# Patient Record
Sex: MALE | Race: WHITE | Employment: FULL TIME | URBAN - METROPOLITAN AREA
[De-identification: names, ages, dates, MRNs, and addresses within clinical notes are randomized per-mention and may not be internally consistent; named-entity substitution may affect disease eponyms.]

---

## 2018-01-22 ENCOUNTER — TRANSFERRED RECORDS (OUTPATIENT)
Dept: HEALTH INFORMATION MANAGEMENT | Facility: CLINIC | Age: 58
End: 2018-01-22

## 2018-04-16 ENCOUNTER — OFFICE VISIT (OUTPATIENT)
Dept: INTERNAL MEDICINE | Facility: CLINIC | Age: 58
End: 2018-04-16
Payer: COMMERCIAL

## 2018-04-16 VITALS
BODY MASS INDEX: 25.16 KG/M2 | TEMPERATURE: 98 F | DIASTOLIC BLOOD PRESSURE: 80 MMHG | HEIGHT: 68 IN | HEART RATE: 60 BPM | WEIGHT: 166 LBS | OXYGEN SATURATION: 99 % | SYSTOLIC BLOOD PRESSURE: 142 MMHG

## 2018-04-16 DIAGNOSIS — Z00.00 ROUTINE GENERAL MEDICAL EXAMINATION AT A HEALTH CARE FACILITY: Primary | ICD-10-CM

## 2018-04-16 LAB
ALBUMIN SERPL-MCNC: 4.1 G/DL (ref 3.4–5)
ALBUMIN UR-MCNC: NEGATIVE MG/DL
ALP SERPL-CCNC: 51 U/L (ref 40–150)
ALT SERPL W P-5'-P-CCNC: 33 U/L (ref 0–70)
ANION GAP SERPL CALCULATED.3IONS-SCNC: 5 MMOL/L (ref 3–14)
APPEARANCE UR: CLEAR
AST SERPL W P-5'-P-CCNC: 45 U/L (ref 0–45)
BILIRUB SERPL-MCNC: 0.4 MG/DL (ref 0.2–1.3)
BILIRUB UR QL STRIP: NEGATIVE
BUN SERPL-MCNC: 21 MG/DL (ref 7–30)
CALCIUM SERPL-MCNC: 9 MG/DL (ref 8.5–10.1)
CHLORIDE SERPL-SCNC: 108 MMOL/L (ref 94–109)
CHOLEST SERPL-MCNC: 214 MG/DL
CO2 SERPL-SCNC: 27 MMOL/L (ref 20–32)
COLOR UR AUTO: YELLOW
CREAT SERPL-MCNC: 0.78 MG/DL (ref 0.66–1.25)
ERYTHROCYTE [DISTWIDTH] IN BLOOD BY AUTOMATED COUNT: 12.7 % (ref 10–15)
GFR SERPL CREATININE-BSD FRML MDRD: >90 ML/MIN/1.7M2
GLUCOSE SERPL-MCNC: 99 MG/DL (ref 70–99)
GLUCOSE UR STRIP-MCNC: NEGATIVE MG/DL
HCT VFR BLD AUTO: 40 % (ref 40–53)
HDLC SERPL-MCNC: 70 MG/DL
HGB BLD-MCNC: 12.9 G/DL (ref 13.3–17.7)
HGB UR QL STRIP: NEGATIVE
KETONES UR STRIP-MCNC: NEGATIVE MG/DL
LDLC SERPL CALC-MCNC: 119 MG/DL
LEUKOCYTE ESTERASE UR QL STRIP: NEGATIVE
MCH RBC QN AUTO: 29.3 PG (ref 26.5–33)
MCHC RBC AUTO-ENTMCNC: 32.3 G/DL (ref 31.5–36.5)
MCV RBC AUTO: 91 FL (ref 78–100)
NITRATE UR QL: NEGATIVE
NONHDLC SERPL-MCNC: 144 MG/DL
PH UR STRIP: 5.5 PH (ref 5–7)
PLATELET # BLD AUTO: 259 10E9/L (ref 150–450)
POTASSIUM SERPL-SCNC: 4.1 MMOL/L (ref 3.4–5.3)
PROT SERPL-MCNC: 7 G/DL (ref 6.8–8.8)
PSA SERPL-ACNC: 0.3 UG/L (ref 0–4)
RBC # BLD AUTO: 4.4 10E12/L (ref 4.4–5.9)
SODIUM SERPL-SCNC: 140 MMOL/L (ref 133–144)
SOURCE: NORMAL
SP GR UR STRIP: <=1.005 (ref 1–1.03)
TRIGL SERPL-MCNC: 123 MG/DL
TSH SERPL DL<=0.005 MIU/L-ACNC: 0.81 MU/L (ref 0.4–4)
UROBILINOGEN UR STRIP-ACNC: 0.2 EU/DL (ref 0.2–1)
WBC # BLD AUTO: 8.3 10E9/L (ref 4–11)

## 2018-04-16 PROCEDURE — 85027 COMPLETE CBC AUTOMATED: CPT | Performed by: INTERNAL MEDICINE

## 2018-04-16 PROCEDURE — 81003 URINALYSIS AUTO W/O SCOPE: CPT | Performed by: INTERNAL MEDICINE

## 2018-04-16 PROCEDURE — 36415 COLL VENOUS BLD VENIPUNCTURE: CPT | Performed by: INTERNAL MEDICINE

## 2018-04-16 PROCEDURE — G0103 PSA SCREENING: HCPCS | Performed by: INTERNAL MEDICINE

## 2018-04-16 PROCEDURE — 80053 COMPREHEN METABOLIC PANEL: CPT | Performed by: INTERNAL MEDICINE

## 2018-04-16 PROCEDURE — 84443 ASSAY THYROID STIM HORMONE: CPT | Performed by: INTERNAL MEDICINE

## 2018-04-16 PROCEDURE — 99386 PREV VISIT NEW AGE 40-64: CPT | Performed by: INTERNAL MEDICINE

## 2018-04-16 PROCEDURE — 80061 LIPID PANEL: CPT | Performed by: INTERNAL MEDICINE

## 2018-04-16 NOTE — LETTER
April 16, 2018      Jesus Haley  [address] [address] [address]        Dear ,    The results of your recent tests are within acceptable limits.  Enclosed is a copy of the results.  It was a pleasure to see you at your last appointment.    Resulted Orders   CBC with platelets   Result Value Ref Range    WBC 8.3 4.0 - 11.0 10e9/L    RBC Count 4.40 4.4 - 5.9 10e12/L    Hemoglobin 12.9 (L) 13.3 - 17.7 g/dL      Comment:      Results confirmed by repeat test    Hematocrit 40.0 40.0 - 53.0 %    MCV 91 78 - 100 fl    MCH 29.3 26.5 - 33.0 pg    MCHC 32.3 31.5 - 36.5 g/dL    RDW 12.7 10.0 - 15.0 %    Platelet Count 259 150 - 450 10e9/L   Comprehensive metabolic panel   Result Value Ref Range    Sodium 140 133 - 144 mmol/L    Potassium 4.1 3.4 - 5.3 mmol/L    Chloride 108 94 - 109 mmol/L    Carbon Dioxide 27 20 - 32 mmol/L    Anion Gap 5 3 - 14 mmol/L    Glucose 99 70 - 99 mg/dL    Urea Nitrogen 21 7 - 30 mg/dL    Creatinine 0.78 0.66 - 1.25 mg/dL    GFR Estimate >90 >60 mL/min/1.7m2      Comment:      Non  GFR Calc    GFR Estimate If Black >90 >60 mL/min/1.7m2      Comment:       GFR Calc    Calcium 9.0 8.5 - 10.1 mg/dL    Bilirubin Total 0.4 0.2 - 1.3 mg/dL    Albumin 4.1 3.4 - 5.0 g/dL    Protein Total 7.0 6.8 - 8.8 g/dL    Alkaline Phosphatase 51 40 - 150 U/L    ALT 33 0 - 70 U/L    AST 45 0 - 45 U/L   TSH with free T4 reflex   Result Value Ref Range    TSH 0.81 0.40 - 4.00 mU/L   Prostate spec antigen screen   Result Value Ref Range    PSA 0.30 0 - 4 ug/L      Comment:      Assay Method:  Chemiluminescence using Siemens Vista analyzer   *UA reflex to Microscopic   Result Value Ref Range    Color Urine Yellow     Appearance Urine Clear     Glucose Urine Negative NEG^Negative mg/dL    Bilirubin Urine Negative NEG^Negative    Ketones Urine Negative NEG^Negative mg/dL    Specific Gravity Urine <=1.005 1.003 - 1.035    Blood Urine Negative NEG^Negative    pH  Urine 5.5 5.0 - 7.0 pH    Protein Albumin Urine Negative NEG^Negative mg/dL    Urobilinogen Urine 0.2 0.2 - 1.0 EU/dL    Nitrite Urine Negative NEG^Negative    Leukocyte Esterase Urine Negative NEG^Negative    Source Midstream Urine    Lipid Profile (Chol, Trig, HDL, LDL calc)   Result Value Ref Range    Cholesterol 214 (H) <200 mg/dL      Comment:      Desirable:       <200 mg/dl    Triglycerides 123 <150 mg/dL    HDL Cholesterol 70 >39 mg/dL    LDL Cholesterol Calculated 119 (H) <100 mg/dL      Comment:      Above desirable:  100-129 mg/dl  Borderline High:  130-159 mg/dL  High:             160-189 mg/dL  Very high:       >189 mg/dl      Non HDL Cholesterol 144 (H) <130 mg/dL      Comment:      Above Desirable:  130-159 mg/dl  Borderline high:  160-189 mg/dl  High:             190-219 mg/dl  Very high:       >219 mg/dl         If you have any questions or concerns, please call the clinic at the number listed above.       Sincerely,        Eliot Jay MD

## 2018-04-16 NOTE — NURSING NOTE
"Chief Complaint   Patient presents with     Physical     Non Fasting Px     Swelling     Occasionall swelling of Rt ankle       Initial /80  Pulse 60  Temp 98  F (36.7  C) (Oral)  Ht 5' 8\" (1.727 m)  Wt 166 lb (75.3 kg)  SpO2 99%  BMI 25.24 kg/m2 Estimated body mass index is 25.24 kg/(m^2) as calculated from the following:    Height as of this encounter: 5' 8\" (1.727 m).    Weight as of this encounter: 166 lb (75.3 kg).  Medication Reconciliation: complete   Deepthi Souza MA      "

## 2018-04-16 NOTE — MR AVS SNAPSHOT
"              After Visit Summary   2018    Jesus Haley    MRN: [id number]           Patient Information     Date Of Birth          1960        Visit Information        Provider Department      2018 8:00 AM Eliot Jay MD [facility]        Today's Diagnoses     Routine general medical examination at a health care facility    -  1       Follow-ups after your visit        Follow-up notes from your care team     Return in about 1 year (around 2019) for Physical Exam.      Who to contact     If you have questions or need follow up information about today's clinic visit or your schedule please contact [facility] directly at [phone].  Normal or non-critical lab and imaging results will be communicated to you by MyChart, letter or phone within 4 business days after the clinic has received the results. If you do not hear from us within 7 days, please contact the clinic through MyChart or phone. If you have a critical or abnormal lab result, we will notify you by phone as soon as possible.  Submit refill requests through [employer] or call your pharmacy and they will forward the refill request to us. Please allow 3 business days for your refill to be completed.          Additional Information About Your Visit        MyChart Information     [employer] lets you send messages to your doctor, view your test results, renew your prescriptions, schedule appointments and more. To sign up, go to [email or web address] . Click on \"Log in\" on the left side of the screen, which will take you to the Welcome page. Then click on \"Sign up Now\" on the right side of the page.     You will be asked to enter the access code listed below, as well as some personal information. Please follow the directions to create your username and password.     Your access code is: [email or web address]  Expires: 7/15/2018  8:38 AM     Your access code will  in 90 days. If you need help or a new code, " "please call your [facility] clinic or [phone].        Care EveryWhere ID     This is your Care EveryWhere ID. This could be used by other organizations to access your [facility] medical records  [id number]        Your Vitals Were     Pulse Temperature Height Pulse Oximetry BMI (Body Mass Index)       60 98  F (36.7  C) (Oral) 5' 8\" (1.727 m) 99% 25.24 kg/m2        Blood Pressure from Last 3 Encounters:   04/16/18 142/80   09/15/04 130/82   09/15/04 130/82    Weight from Last 3 Encounters:   04/16/18 166 lb (75.3 kg)   09/15/04 173 lb (78.5 kg)   09/15/04 173 lb (78.5 kg)              We Performed the Following     *UA reflex to Microscopic     CBC with platelets     Comprehensive metabolic panel     Lipid panel reflex to direct LDL Fasting     Prostate spec antigen screen     TSH with free T4 reflex        Primary Care Provider Office Phone # Fax #    Eliot Jay [phone]       [address] [facility] [address]        Equal Access to Services     [facility]: Hadii aad ku hadasho Soomaali, waaxda luqadaha, qaybta kaalmada adebettie, mervin snyder . So [facility] [phone].    ATENCIÓN: Si habla español, tiene a montiel disposición servicios gratuitos de asistencia lingüística. Ramon al [phone].    We comply with applicable federal civil rights laws and [place] laws. We do not discriminate on the basis of race, color, national origin, age, disability, sex, sexual orientation, or gender identity.            Thank you!     Thank you for choosing [facility]  for your care. Our goal is always to provide you with excellent care. Hearing back from our patients is one way we can continue to improve our services. Please take a few minutes to complete the written survey that you may receive in the mail after your visit with us. Thank you!             Your Updated Medication List - Protect others around you: Learn how to safely use, store and " throw away your medicines at www.disposemymeds.org.      Notice  As of 4/16/2018  8:38 AM    You have not been prescribed any medications.

## 2018-04-16 NOTE — PROGRESS NOTES
SUBJECTIVE:   CC: Jesus Haley is an 57 year old male who presents for preventative health visit.     Physical   Annual:     Getting at least 3 servings of Calcium per day::  Yes    Bi-annual eye exam::  Yes    Dental care twice a year::  Yes    Sleep apnea or symptoms of sleep apnea::  None    Diet::  Regular (no restrictions)    Frequency of exercise::  6-7 days/week    Duration of exercise::  Greater than 60 minutes    Taking medications regularly::  Yes    Medication side effects::  None                    Today's PHQ-2 Score: 0  PHQ-2 ( 1999 Pfizer) 4/16/2018   Q1: Little interest or pleasure in doing things 0   Q2: Feeling down, depressed or hopeless 0   PHQ-2 Score 0   Q1: Little interest or pleasure in doing things Not at all   Q2: Feeling down, depressed or hopeless Not at all   PHQ-2 Score 0       Abuse: Current or Past(Physical, Sexual or Emotional)- No  Do you feel safe in your environment - Yes    Social History   Substance Use Topics     Smoking status: Never Smoker     Smokeless tobacco: Not on file     Alcohol use No     Alcohol Use 4/16/2018   If you drink alcohol do you typically have greater than 3 drinks per day OR greater than 7 drinks per week? No       Last PSA: No results found for: PSA    Reviewed orders with patient. Reviewed health maintenance and updated orders accordingly - Yes  Labs reviewed in EPIC  BP Readings from Last 3 Encounters:   04/16/18 142/80   09/15/04 130/82   09/15/04 130/82    Wt Readings from Last 3 Encounters:   04/16/18 166 lb (75.3 kg)   09/15/04 173 lb (78.5 kg)   09/15/04 173 lb (78.5 kg)                    Reviewed and updated as needed this visit by clinical staff         Reviewed and updated as needed this visit by Provider            Review of Systems  C: NEGATIVE for fever, chills, change in weight  I: NEGATIVE for worrisome rashes, moles or lesions  E: NEGATIVE for vision changes or irritation  ENT: NEGATIVE for ear, mouth and throat problems  R: NEGATIVE for  significant cough or SOB  CV: NEGATIVE for chest pain, palpitations or peripheral edema  GI: NEGATIVE for nausea, abdominal pain, heartburn, or change in bowel habits   male: negative for dysuria, hematuria, decreased urinary stream, erectile dysfunction, urethral discharge  M: NEGATIVE for significant arthralgias or myalgia  N: NEGATIVE for weakness, dizziness or paresthesias  P: NEGATIVE for changes in mood or affect    OBJECTIVE:   There were no vitals taken for this visit.    Physical Exam  GENERAL: healthy, alert and no distress  EYES: Eyes grossly normal to inspection, PERRL and conjunctivae and sclerae normal  HENT: ear canals and TM's normal, nose and mouth without ulcers or lesions  NECK: no adenopathy, no asymmetry, masses, or scars and thyroid normal to palpation  RESP: lungs clear to auscultation - no rales, rhonchi or wheezes  CV: regular rate and rhythm, normal S1 S2, no S3 or S4, no murmur, click or rub, no peripheral edema and peripheral pulses strong  ABDOMEN: soft, nontender, no hepatosplenomegaly, no masses and bowel sounds normal   (male): normal male genitalia without lesions or urethral discharge, no hernia  MS: no gross musculoskeletal defects noted, no edema  SKIN: no suspicious lesions or rashes  NEURO: Normal strength and tone, mentation intact and speech normal  PSYCH: mentation appears normal, affect normal/bright    ASSESSMENT/PLAN:       ICD-10-CM    1. Routine general medical examination at a health care facility Z00.00 CBC with platelets     Comprehensive metabolic panel     Lipid panel reflex to direct LDL Fasting     TSH with free T4 reflex     Prostate spec antigen screen     *UA reflex to Microscopic       Colonoscopy done age 51, normal , 10 years follow up recommended    COUNSELING:   Reviewed preventive health counseling, as reflected in patient instructions       Regular exercise       Healthy diet/nutrition       Vision screening       Hearing screening       Colon cancer  "screening       Prostate cancer screening         reports that he has never smoked. He does not have any smokeless tobacco history on file.    Estimated body mass index is 26.3 kg/(m^2) as calculated from the following:    Height as of 9/15/04: 5' 8\" (1.727 m).    Weight as of 9/15/04: 173 lb (78.5 kg).       Counseling Resources:  ATP IV Guidelines  Pooled Cohorts Equation Calculator  FRAX Risk Assessment  ICSI Preventive Guidelines  Dietary Guidelines for Americans, 2010  USDA's MyPlate  ASA Prophylaxis  Lung CA Screening    Eliot Jay MD  [facility]  Answers for HPI/ROS submitted by the patient on 4/16/2018   PHQ-2 Score: 0    "

## 2018-06-12 ENCOUNTER — TELEPHONE (OUTPATIENT)
Dept: INTERNAL MEDICINE | Facility: CLINIC | Age: 58
End: 2018-06-12

## 2018-06-12 NOTE — TELEPHONE ENCOUNTER
Panel Management Review      Patient has the following on his problem list: None      Composite cancer screening  Chart review shows that this patient is due/due soon for the following Colonoscopy  Summary:    Patient is due/failing the following:   COLONOSCOPY    Action needed:   Patient needs referral/order: Colonoscopy    Type of outreach:    Spoke to Pt, had Colonoscopy in 04/16/12.  update on Pt's chart    Questions for provider review:    None                                                                                                                                    Deepthi Souza MA       Chart routed to none .

## 2019-01-01 ENCOUNTER — ONCOLOGY VISIT (OUTPATIENT)
Dept: ONCOLOGY | Facility: CLINIC | Age: 59
End: 2019-01-01
Attending: INTERNAL MEDICINE
Payer: COMMERCIAL

## 2019-01-01 ENCOUNTER — HEALTH MAINTENANCE LETTER (OUTPATIENT)
Age: 59
End: 2019-01-01

## 2019-01-01 ENCOUNTER — HOSPITAL ENCOUNTER (EMERGENCY)
Facility: CLINIC | Age: 59
Discharge: HOME | End: 2019-09-14
Attending: EMERGENCY MEDICINE | Admitting: EMERGENCY MEDICINE
Payer: COMMERCIAL

## 2019-01-01 ENCOUNTER — TELEPHONE (OUTPATIENT)
Dept: ONCOLOGY | Facility: CLINIC | Age: 59
End: 2019-01-01

## 2019-01-01 ENCOUNTER — NURSE TRIAGE (OUTPATIENT)
Dept: NURSING | Facility: CLINIC | Age: 59
End: 2019-01-01

## 2019-01-01 ENCOUNTER — TRANSFERRED RECORDS (OUTPATIENT)
Dept: HEALTH INFORMATION MANAGEMENT | Facility: CLINIC | Age: 59
End: 2019-01-01

## 2019-01-01 ENCOUNTER — TELEPHONE (OUTPATIENT)
Dept: PHARMACY | Facility: CLINIC | Age: 59
End: 2019-01-01

## 2019-01-01 ENCOUNTER — OFFICE VISIT (OUTPATIENT)
Dept: UROLOGY | Facility: CLINIC | Age: 59
End: 2019-01-01
Payer: COMMERCIAL

## 2019-01-01 ENCOUNTER — PRE VISIT (OUTPATIENT)
Dept: UROLOGY | Facility: CLINIC | Age: 59
End: 2019-01-01

## 2019-01-01 ENCOUNTER — CARE COORDINATION (OUTPATIENT)
Dept: ONCOLOGY | Facility: CLINIC | Age: 59
End: 2019-01-01

## 2019-01-01 ENCOUNTER — HOSPITAL ENCOUNTER (INPATIENT)
Facility: CLINIC | Age: 59
LOS: 1 days | Discharge: HOME | DRG: 657 | End: 2019-08-02
Attending: UROLOGY | Admitting: UROLOGY
Payer: COMMERCIAL

## 2019-01-01 ENCOUNTER — HOSPITAL ENCOUNTER (OUTPATIENT)
Dept: GENERAL RADIOLOGY | Facility: CLINIC | Age: 59
End: 2019-08-22
Attending: RADIOLOGY
Payer: COMMERCIAL

## 2019-01-01 ENCOUNTER — HOSPITAL ENCOUNTER (OUTPATIENT)
Dept: CT IMAGING | Facility: CLINIC | Age: 59
Discharge: HOME | End: 2019-08-22
Attending: INTERNAL MEDICINE | Admitting: INTERNAL MEDICINE
Payer: COMMERCIAL

## 2019-01-01 ENCOUNTER — ANESTHESIA EVENT (OUTPATIENT)
Dept: SURGERY | Facility: CLINIC | Age: 59
DRG: 657 | End: 2019-01-01
Payer: COMMERCIAL

## 2019-01-01 ENCOUNTER — HOSPITAL ENCOUNTER (OUTPATIENT)
Facility: CLINIC | Age: 59
Setting detail: SPECIMEN
Discharge: HOME | End: 2019-07-29
Attending: INTERNAL MEDICINE | Admitting: INTERNAL MEDICINE
Payer: COMMERCIAL

## 2019-01-01 ENCOUNTER — ANCILLARY PROCEDURE (OUTPATIENT)
Dept: GENERAL RADIOLOGY | Facility: CLINIC | Age: 59
End: 2019-01-01
Attending: INTERNAL MEDICINE
Payer: COMMERCIAL

## 2019-01-01 ENCOUNTER — PATIENT OUTREACH (OUTPATIENT)
Dept: ONCOLOGY | Facility: CLINIC | Age: 59
End: 2019-01-01

## 2019-01-01 ENCOUNTER — TELEPHONE (OUTPATIENT)
Dept: UROLOGY | Facility: CLINIC | Age: 59
End: 2019-01-01

## 2019-01-01 ENCOUNTER — MYC MEDICAL ADVICE (OUTPATIENT)
Dept: ONCOLOGY | Facility: CLINIC | Age: 59
End: 2019-01-01

## 2019-01-01 ENCOUNTER — OFFICE VISIT (OUTPATIENT)
Dept: INTERNAL MEDICINE | Facility: CLINIC | Age: 59
End: 2019-01-01
Payer: COMMERCIAL

## 2019-01-01 ENCOUNTER — PRE VISIT (OUTPATIENT)
Dept: PULMONOLOGY | Facility: CLINIC | Age: 59
End: 2019-01-01

## 2019-01-01 ENCOUNTER — TELEPHONE (OUTPATIENT)
Dept: INTERNAL MEDICINE | Facility: CLINIC | Age: 59
End: 2019-01-01

## 2019-01-01 ENCOUNTER — HOSPITAL ENCOUNTER (OUTPATIENT)
Dept: CT IMAGING | Facility: CLINIC | Age: 59
Discharge: HOME | End: 2019-07-25
Attending: INTERNAL MEDICINE | Admitting: INTERNAL MEDICINE
Payer: COMMERCIAL

## 2019-01-01 ENCOUNTER — PRE VISIT (OUTPATIENT)
Dept: ONCOLOGY | Facility: CLINIC | Age: 59
End: 2019-01-01

## 2019-01-01 ENCOUNTER — ANESTHESIA (OUTPATIENT)
Dept: SURGERY | Facility: CLINIC | Age: 59
DRG: 657 | End: 2019-01-01
Payer: COMMERCIAL

## 2019-01-01 ENCOUNTER — HOSPITAL ENCOUNTER (OUTPATIENT)
Dept: CT IMAGING | Facility: CLINIC | Age: 59
Discharge: HOME | End: 2019-07-26
Attending: INTERNAL MEDICINE | Admitting: INTERNAL MEDICINE
Payer: COMMERCIAL

## 2019-01-01 VITALS
HEIGHT: 67 IN | SYSTOLIC BLOOD PRESSURE: 127 MMHG | HEART RATE: 58 BPM | TEMPERATURE: 98.2 F | OXYGEN SATURATION: 99 % | DIASTOLIC BLOOD PRESSURE: 76 MMHG | WEIGHT: 157.2 LBS | RESPIRATION RATE: 16 BRPM | BODY MASS INDEX: 24.67 KG/M2

## 2019-01-01 VITALS
SYSTOLIC BLOOD PRESSURE: 148 MMHG | WEIGHT: 157.7 LBS | HEART RATE: 69 BPM | BODY MASS INDEX: 22.58 KG/M2 | DIASTOLIC BLOOD PRESSURE: 84 MMHG | TEMPERATURE: 98.7 F | RESPIRATION RATE: 16 BRPM | OXYGEN SATURATION: 97 % | HEIGHT: 70 IN

## 2019-01-01 VITALS
DIASTOLIC BLOOD PRESSURE: 76 MMHG | RESPIRATION RATE: 16 BRPM | HEART RATE: 62 BPM | SYSTOLIC BLOOD PRESSURE: 125 MMHG | OXYGEN SATURATION: 96 %

## 2019-01-01 VITALS
HEART RATE: 54 BPM | HEIGHT: 67 IN | DIASTOLIC BLOOD PRESSURE: 85 MMHG | RESPIRATION RATE: 16 BRPM | WEIGHT: 157 LBS | DIASTOLIC BLOOD PRESSURE: 66 MMHG | HEART RATE: 56 BPM | WEIGHT: 162 LBS | BODY MASS INDEX: 24.59 KG/M2 | OXYGEN SATURATION: 90 % | SYSTOLIC BLOOD PRESSURE: 120 MMHG | OXYGEN SATURATION: 98 % | BODY MASS INDEX: 25.43 KG/M2 | TEMPERATURE: 98.2 F | SYSTOLIC BLOOD PRESSURE: 142 MMHG

## 2019-01-01 VITALS
OXYGEN SATURATION: 97 % | HEIGHT: 67 IN | BODY MASS INDEX: 24.64 KG/M2 | HEART RATE: 56 BPM | SYSTOLIC BLOOD PRESSURE: 132 MMHG | DIASTOLIC BLOOD PRESSURE: 86 MMHG | WEIGHT: 157 LBS

## 2019-01-01 VITALS
OXYGEN SATURATION: 98 % | SYSTOLIC BLOOD PRESSURE: 120 MMHG | DIASTOLIC BLOOD PRESSURE: 68 MMHG | HEART RATE: 59 BPM | WEIGHT: 150 LBS | HEIGHT: 67 IN | BODY MASS INDEX: 23.54 KG/M2

## 2019-01-01 VITALS
BODY MASS INDEX: 23.49 KG/M2 | DIASTOLIC BLOOD PRESSURE: 90 MMHG | SYSTOLIC BLOOD PRESSURE: 145 MMHG | HEART RATE: 67 BPM | WEIGHT: 150 LBS | OXYGEN SATURATION: 91 % | TEMPERATURE: 98 F | RESPIRATION RATE: 16 BRPM

## 2019-01-01 VITALS
OXYGEN SATURATION: 96 % | TEMPERATURE: 97.9 F | RESPIRATION RATE: 16 BRPM | SYSTOLIC BLOOD PRESSURE: 160 MMHG | WEIGHT: 163 LBS | DIASTOLIC BLOOD PRESSURE: 86 MMHG | HEART RATE: 63 BPM | BODY MASS INDEX: 25.53 KG/M2

## 2019-01-01 DIAGNOSIS — J06.9 VIRAL UPPER RESPIRATORY TRACT INFECTION: Primary | ICD-10-CM

## 2019-01-01 DIAGNOSIS — N28.89 RENAL MASS: Primary | ICD-10-CM

## 2019-01-01 DIAGNOSIS — R91.8 PULMONARY NODULES: ICD-10-CM

## 2019-01-01 DIAGNOSIS — C64.1 RENAL MALIGNANT NEOPLASM, RIGHT (H): Primary | ICD-10-CM

## 2019-01-01 DIAGNOSIS — G89.18 PAIN AT SURGICAL SITE: ICD-10-CM

## 2019-01-01 DIAGNOSIS — C64.1 RENAL MALIGNANT NEOPLASM, RIGHT (H): ICD-10-CM

## 2019-01-01 DIAGNOSIS — R59.0 MEDIASTINAL LYMPHADENOPATHY: ICD-10-CM

## 2019-01-01 DIAGNOSIS — C64.9 RENAL CELL CARCINOMA (H): Primary | ICD-10-CM

## 2019-01-01 DIAGNOSIS — N28.89 RIGHT KIDNEY MASS: ICD-10-CM

## 2019-01-01 DIAGNOSIS — N28.89 RIGHT KIDNEY MASS: Primary | ICD-10-CM

## 2019-01-01 DIAGNOSIS — G89.3 CANCER ASSOCIATED PAIN: ICD-10-CM

## 2019-01-01 DIAGNOSIS — I10 ESSENTIAL HYPERTENSION, BENIGN: ICD-10-CM

## 2019-01-01 DIAGNOSIS — Z00.00 ROUTINE GENERAL MEDICAL EXAMINATION AT A HEALTH CARE FACILITY: ICD-10-CM

## 2019-01-01 DIAGNOSIS — J06.9 VIRAL UPPER RESPIRATORY TRACT INFECTION: ICD-10-CM

## 2019-01-01 DIAGNOSIS — M62.81 GENERALIZED MUSCLE WEAKNESS: ICD-10-CM

## 2019-01-01 DIAGNOSIS — C64.9 METASTATIC RENAL CELL CARCINOMA (H): Primary | ICD-10-CM

## 2019-01-01 DIAGNOSIS — N28.89 RENAL MASS: ICD-10-CM

## 2019-01-01 LAB
ABO + RH BLD: NORMAL
ABO + RH BLD: NORMAL
ALBUMIN SERPL-MCNC: 3.4 G/DL (ref 3.4–5)
ALBUMIN SERPL-MCNC: 3.4 G/DL (ref 3.4–5)
ALBUMIN SERPL-MCNC: 3.6 G/DL (ref 3.4–5)
ALP SERPL-CCNC: 73 U/L (ref 40–150)
ALP SERPL-CCNC: 77 U/L (ref 40–150)
ALP SERPL-CCNC: 78 U/L (ref 40–150)
ALP SERPL-CCNC: 81 U/L (ref 40–150)
ALT SERPL W P-5'-P-CCNC: 123 U/L (ref 0–70)
ALT SERPL W P-5'-P-CCNC: 18 U/L (ref 0–70)
ALT SERPL W P-5'-P-CCNC: 23 U/L (ref 0–70)
ALT SERPL W P-5'-P-CCNC: 62 U/L (ref 0–70)
ANION GAP SERPL CALCULATED.3IONS-SCNC: 3 MMOL/L (ref 3–14)
ANION GAP SERPL CALCULATED.3IONS-SCNC: 4 MMOL/L (ref 3–14)
ANION GAP SERPL CALCULATED.3IONS-SCNC: 5 MMOL/L (ref 3–14)
AST SERPL W P-5'-P-CCNC: 11 U/L (ref 0–45)
AST SERPL W P-5'-P-CCNC: 20 U/L (ref 0–45)
AST SERPL W P-5'-P-CCNC: 31 U/L (ref 0–45)
AST SERPL W P-5'-P-CCNC: 36 U/L (ref 0–45)
BASOPHILS # BLD AUTO: 0 10E9/L (ref 0–0.2)
BASOPHILS # BLD AUTO: 0.1 10E9/L (ref 0–0.2)
BASOPHILS # BLD AUTO: 0.1 10E9/L (ref 0–0.2)
BASOPHILS NFR BLD AUTO: 0.4 %
BASOPHILS NFR BLD AUTO: 0.5 %
BASOPHILS NFR BLD AUTO: 0.7 %
BILIRUB DIRECT SERPL-MCNC: 0.1 MG/DL (ref 0–0.2)
BILIRUB SERPL-MCNC: 0.2 MG/DL (ref 0.2–1.3)
BILIRUB SERPL-MCNC: 0.2 MG/DL (ref 0.2–1.3)
BILIRUB SERPL-MCNC: 0.3 MG/DL (ref 0.2–1.3)
BILIRUB SERPL-MCNC: 0.4 MG/DL (ref 0.2–1.3)
BLD GP AB SCN SERPL QL: NORMAL
BLOOD BANK CMNT PATIENT-IMP: NORMAL
BUN SERPL-MCNC: 14 MG/DL (ref 7–30)
BUN SERPL-MCNC: 16 MG/DL (ref 7–30)
BUN SERPL-MCNC: 17 MG/DL (ref 7–30)
BUN SERPL-MCNC: 22 MG/DL (ref 7–30)
BUN SERPL-MCNC: 24 MG/DL (ref 7–30)
CALCIUM SERPL-MCNC: 8.4 MG/DL (ref 8.5–10.1)
CALCIUM SERPL-MCNC: 8.9 MG/DL (ref 8.5–10.1)
CALCIUM SERPL-MCNC: 9.1 MG/DL (ref 8.5–10.1)
CALCIUM SERPL-MCNC: 9.4 MG/DL (ref 8.5–10.1)
CALCIUM SERPL-MCNC: 9.6 MG/DL (ref 8.5–10.1)
CHLORIDE SERPL-SCNC: 101 MMOL/L (ref 94–109)
CHLORIDE SERPL-SCNC: 104 MMOL/L (ref 94–109)
CHLORIDE SERPL-SCNC: 105 MMOL/L (ref 94–109)
CHOLEST SERPL-MCNC: 147 MG/DL
CK SERPL-CCNC: 54 U/L (ref 30–300)
CO2 SERPL-SCNC: 29 MMOL/L (ref 20–32)
CO2 SERPL-SCNC: 29 MMOL/L (ref 20–32)
CO2 SERPL-SCNC: 30 MMOL/L (ref 20–32)
COPATH REPORT: NORMAL
COPATH REPORT: NORMAL
CREAT BLD-MCNC: 0.9 MG/DL (ref 0.66–1.25)
CREAT SERPL-MCNC: 0.76 MG/DL (ref 0.66–1.25)
CREAT SERPL-MCNC: 0.84 MG/DL (ref 0.66–1.25)
CREAT SERPL-MCNC: 0.9 MG/DL (ref 0.66–1.25)
CREAT SERPL-MCNC: 0.94 MG/DL (ref 0.66–1.25)
CREAT SERPL-MCNC: 1.06 MG/DL (ref 0.66–1.25)
DIFFERENTIAL METHOD BLD: ABNORMAL
EOSINOPHIL # BLD AUTO: 0.2 10E9/L (ref 0–0.7)
EOSINOPHIL # BLD AUTO: 0.3 10E9/L (ref 0–0.7)
EOSINOPHIL # BLD AUTO: 0.4 10E9/L (ref 0–0.7)
EOSINOPHIL NFR BLD AUTO: 3.1 %
EOSINOPHIL NFR BLD AUTO: 3.5 %
EOSINOPHIL NFR BLD AUTO: 3.7 %
ERYTHROCYTE [DISTWIDTH] IN BLOOD BY AUTOMATED COUNT: 12.5 % (ref 10–15)
ERYTHROCYTE [DISTWIDTH] IN BLOOD BY AUTOMATED COUNT: 12.7 % (ref 10–15)
ERYTHROCYTE [DISTWIDTH] IN BLOOD BY AUTOMATED COUNT: 12.8 % (ref 10–15)
ERYTHROCYTE [DISTWIDTH] IN BLOOD BY AUTOMATED COUNT: 13 % (ref 10–15)
ERYTHROCYTE [DISTWIDTH] IN BLOOD BY AUTOMATED COUNT: 13 % (ref 10–15)
GFR SERPL CREATININE-BSD FRML MDRD: 76 ML/MIN/{1.73_M2}
GFR SERPL CREATININE-BSD FRML MDRD: 87 ML/MIN/{1.73_M2}
GFR SERPL CREATININE-BSD FRML MDRD: 89 ML/MIN/{1.73_M2}
GFR SERPL CREATININE-BSD FRML MDRD: >90 ML/MIN/{1.73_M2}
GLUCOSE SERPL-MCNC: 106 MG/DL (ref 70–99)
GLUCOSE SERPL-MCNC: 108 MG/DL (ref 70–99)
GLUCOSE SERPL-MCNC: 115 MG/DL (ref 70–99)
GLUCOSE SERPL-MCNC: 166 MG/DL (ref 70–99)
GLUCOSE SERPL-MCNC: 182 MG/DL (ref 70–99)
HCT VFR BLD AUTO: 35.8 % (ref 40–53)
HCT VFR BLD AUTO: 36.4 % (ref 40–53)
HCT VFR BLD AUTO: 36.5 % (ref 40–53)
HCT VFR BLD AUTO: 37 % (ref 40–53)
HCT VFR BLD AUTO: 37.5 % (ref 40–53)
HDLC SERPL-MCNC: 40 MG/DL
HGB BLD-MCNC: 11.5 G/DL (ref 13.3–17.7)
HGB BLD-MCNC: 11.8 G/DL (ref 13.3–17.7)
HGB BLD-MCNC: 12 G/DL (ref 13.3–17.7)
HGB BLD-MCNC: 12.3 G/DL (ref 13.3–17.7)
HGB BLD-MCNC: 12.3 G/DL (ref 13.3–17.7)
IMM GRANULOCYTES # BLD: 0 10E9/L (ref 0–0.4)
IMM GRANULOCYTES # BLD: 0.1 10E9/L (ref 0–0.4)
IMM GRANULOCYTES # BLD: 0.1 10E9/L (ref 0–0.4)
IMM GRANULOCYTES NFR BLD: 0.3 %
IMM GRANULOCYTES NFR BLD: 0.4 %
IMM GRANULOCYTES NFR BLD: 0.6 %
INR PPP: 0.93 (ref 0.86–1.14)
INR PPP: 0.99 (ref 0.86–1.14)
INTERPRETATION ECG - MUSE: NORMAL
LACTATE BLD-SCNC: 1.1 MMOL/L (ref 0.7–2)
LDH SERPL L TO P-CCNC: 206 U/L (ref 85–227)
LDH SERPL L TO P-CCNC: 226 U/L (ref 85–227)
LDLC SERPL CALC-MCNC: 79 MG/DL
LYMPHOCYTES # BLD AUTO: 0.6 10E9/L (ref 0.8–5.3)
LYMPHOCYTES # BLD AUTO: 0.7 10E9/L (ref 0.8–5.3)
LYMPHOCYTES # BLD AUTO: 0.8 10E9/L (ref 0.8–5.3)
LYMPHOCYTES NFR BLD AUTO: 10.5 %
LYMPHOCYTES NFR BLD AUTO: 6 %
LYMPHOCYTES NFR BLD AUTO: 8.7 %
MCH RBC QN AUTO: 28.1 PG (ref 26.5–33)
MCH RBC QN AUTO: 28.5 PG (ref 26.5–33)
MCH RBC QN AUTO: 28.6 PG (ref 26.5–33)
MCHC RBC AUTO-ENTMCNC: 32.1 G/DL (ref 31.5–36.5)
MCHC RBC AUTO-ENTMCNC: 32.3 G/DL (ref 31.5–36.5)
MCHC RBC AUTO-ENTMCNC: 32.8 G/DL (ref 31.5–36.5)
MCHC RBC AUTO-ENTMCNC: 33 G/DL (ref 31.5–36.5)
MCHC RBC AUTO-ENTMCNC: 33.2 G/DL (ref 31.5–36.5)
MCV RBC AUTO: 86 FL (ref 78–100)
MCV RBC AUTO: 87 FL (ref 78–100)
MCV RBC AUTO: 87 FL (ref 78–100)
MCV RBC AUTO: 88 FL (ref 78–100)
MCV RBC AUTO: 88 FL (ref 78–100)
MONOCYTES # BLD AUTO: 0.6 10E9/L (ref 0–1.3)
MONOCYTES # BLD AUTO: 1 10E9/L (ref 0–1.3)
MONOCYTES # BLD AUTO: 1.1 10E9/L (ref 0–1.3)
MONOCYTES NFR BLD AUTO: 11.3 %
MONOCYTES NFR BLD AUTO: 9.1 %
MONOCYTES NFR BLD AUTO: 9.5 %
NEUTROPHILS # BLD AUTO: 4.6 10E9/L (ref 1.6–8.3)
NEUTROPHILS # BLD AUTO: 6.6 10E9/L (ref 1.6–8.3)
NEUTROPHILS # BLD AUTO: 9.3 10E9/L (ref 1.6–8.3)
NEUTROPHILS NFR BLD AUTO: 75.6 %
NEUTROPHILS NFR BLD AUTO: 75.7 %
NEUTROPHILS NFR BLD AUTO: 80.4 %
NONHDLC SERPL-MCNC: 107 MG/DL
NRBC # BLD AUTO: 0 10*3/UL
NRBC BLD AUTO-RTO: 0 /100
PLATELET # BLD AUTO: 278 10E9/L (ref 150–450)
PLATELET # BLD AUTO: 286 10E9/L (ref 150–450)
PLATELET # BLD AUTO: 320 10E9/L (ref 150–450)
PLATELET # BLD AUTO: 332 10E9/L (ref 150–450)
PLATELET # BLD AUTO: 385 10E9/L (ref 150–450)
POTASSIUM SERPL-SCNC: 3.8 MMOL/L (ref 3.4–5.3)
POTASSIUM SERPL-SCNC: 4 MMOL/L (ref 3.4–5.3)
POTASSIUM SERPL-SCNC: 4.1 MMOL/L (ref 3.4–5.3)
POTASSIUM SERPL-SCNC: 4.5 MMOL/L (ref 3.4–5.3)
POTASSIUM SERPL-SCNC: 5.1 MMOL/L (ref 3.4–5.3)
PROT SERPL-MCNC: 7.5 G/DL (ref 6.8–8.8)
PROT SERPL-MCNC: 7.6 G/DL (ref 6.8–8.8)
PROT SERPL-MCNC: 7.7 G/DL (ref 6.8–8.8)
RADIOLOGIST FLAGS: ABNORMAL
RADIOLOGIST FLAGS: ABNORMAL
RBC # BLD AUTO: 4.09 10E12/L (ref 4.4–5.9)
RBC # BLD AUTO: 4.14 10E12/L (ref 4.4–5.9)
RBC # BLD AUTO: 4.19 10E12/L (ref 4.4–5.9)
RBC # BLD AUTO: 4.31 10E12/L (ref 4.4–5.9)
RBC # BLD AUTO: 4.31 10E12/L (ref 4.4–5.9)
SODIUM SERPL-SCNC: 136 MMOL/L (ref 133–144)
SODIUM SERPL-SCNC: 137 MMOL/L (ref 133–144)
SODIUM SERPL-SCNC: 138 MMOL/L (ref 133–144)
SODIUM SERPL-SCNC: 139 MMOL/L (ref 133–144)
SODIUM SERPL-SCNC: 140 MMOL/L (ref 133–144)
SPECIMEN EXP DATE BLD: NORMAL
TRIGL SERPL-MCNC: 142 MG/DL
TSH SERPL DL<=0.005 MIU/L-ACNC: 0.66 MU/L (ref 0.4–4)
WBC # BLD AUTO: 11.6 10E9/L (ref 4–11)
WBC # BLD AUTO: 11.7 10E9/L (ref 4–11)
WBC # BLD AUTO: 6 10E9/L (ref 4–11)
WBC # BLD AUTO: 8.7 10E9/L (ref 4–11)
WBC # BLD AUTO: 9.5 10E9/L (ref 4–11)

## 2019-01-01 PROCEDURE — 71250 CT THORAX DX C-: CPT

## 2019-01-01 PROCEDURE — 0TT04ZZ RESECTION OF RIGHT KIDNEY, PERCUTANEOUS ENDOSCOPIC APPROACH: ICD-10-PCS | Performed by: UROLOGY

## 2019-01-01 PROCEDURE — 88342 IMHCHEM/IMCYTCHM 1ST ANTB: CPT | Performed by: INTERNAL MEDICINE

## 2019-01-01 PROCEDURE — 25000128 H RX IP 250 OP 636: Performed by: INTERNAL MEDICINE

## 2019-01-01 PROCEDURE — 86901 BLOOD TYPING SEROLOGIC RH(D): CPT | Performed by: UROLOGY

## 2019-01-01 PROCEDURE — 85025 COMPLETE CBC W/AUTO DIFF WBC: CPT | Performed by: EMERGENCY MEDICINE

## 2019-01-01 PROCEDURE — 37000008 ZZH ANESTHESIA TECHNICAL FEE, 1ST 30 MIN: Performed by: UROLOGY

## 2019-01-01 PROCEDURE — 25000132 ZZH RX MED GY IP 250 OP 250 PS 637: Performed by: UROLOGY

## 2019-01-01 PROCEDURE — G0463 HOSPITAL OUTPT CLINIC VISIT: HCPCS

## 2019-01-01 PROCEDURE — 36415 COLL VENOUS BLD VENIPUNCTURE: CPT | Performed by: UROLOGY

## 2019-01-01 PROCEDURE — 25000125 ZZHC RX 250: Performed by: NURSE ANESTHETIST, CERTIFIED REGISTERED

## 2019-01-01 PROCEDURE — 84075 ASSAY ALKALINE PHOSPHATASE: CPT

## 2019-01-01 PROCEDURE — 25000128 H RX IP 250 OP 636: Performed by: UROLOGY

## 2019-01-01 PROCEDURE — 40000986 XR CHEST 2 VW

## 2019-01-01 PROCEDURE — 50545 LAPARO RADICAL NEPHRECTOMY: CPT | Mod: RT | Performed by: PHYSICIAN ASSISTANT

## 2019-01-01 PROCEDURE — 25000131 ZZH RX MED GY IP 250 OP 636 PS 637: Performed by: UROLOGY

## 2019-01-01 PROCEDURE — 27210794 ZZH OR GENERAL SUPPLY STERILE: Performed by: UROLOGY

## 2019-01-01 PROCEDURE — G0463 HOSPITAL OUTPT CLINIC VISIT: HCPCS | Mod: ZF

## 2019-01-01 PROCEDURE — 82248 BILIRUBIN DIRECT: CPT

## 2019-01-01 PROCEDURE — 85610 PROTHROMBIN TIME: CPT | Performed by: RADIOLOGY

## 2019-01-01 PROCEDURE — 36000093 ZZH SURGERY LEVEL 4 1ST 30 MIN: Performed by: UROLOGY

## 2019-01-01 PROCEDURE — 50545 LAPARO RADICAL NEPHRECTOMY: CPT | Mod: RT | Performed by: UROLOGY

## 2019-01-01 PROCEDURE — 99214 OFFICE O/P EST MOD 30 MIN: CPT | Performed by: INTERNAL MEDICINE

## 2019-01-01 PROCEDURE — 82550 ASSAY OF CK (CPK): CPT | Performed by: EMERGENCY MEDICINE

## 2019-01-01 PROCEDURE — 36415 COLL VENOUS BLD VENIPUNCTURE: CPT

## 2019-01-01 PROCEDURE — 99283 EMERGENCY DEPT VISIT LOW MDM: CPT

## 2019-01-01 PROCEDURE — 25000125 ZZHC RX 250

## 2019-01-01 PROCEDURE — 25000128 H RX IP 250 OP 636: Performed by: NURSE ANESTHETIST, CERTIFIED REGISTERED

## 2019-01-01 PROCEDURE — 25000132 ZZH RX MED GY IP 250 OP 250 PS 637: Performed by: NURSE ANESTHETIST, CERTIFIED REGISTERED

## 2019-01-01 PROCEDURE — 85027 COMPLETE CBC AUTOMATED: CPT | Performed by: UROLOGY

## 2019-01-01 PROCEDURE — 85025 COMPLETE CBC W/AUTO DIFF WBC: CPT | Performed by: INTERNAL MEDICINE

## 2019-01-01 PROCEDURE — 99205 OFFICE O/P NEW HI 60 MIN: CPT | Performed by: INTERNAL MEDICINE

## 2019-01-01 PROCEDURE — 25800030 ZZH RX IP 258 OP 636: Performed by: NURSE ANESTHETIST, CERTIFIED REGISTERED

## 2019-01-01 PROCEDURE — 82247 BILIRUBIN TOTAL: CPT

## 2019-01-01 PROCEDURE — 85610 PROTHROMBIN TIME: CPT | Performed by: EMERGENCY MEDICINE

## 2019-01-01 PROCEDURE — 36000063 ZZH SURGERY LEVEL 4 EA 15 ADDTL MIN: Performed by: UROLOGY

## 2019-01-01 PROCEDURE — 88307 TISSUE EXAM BY PATHOLOGIST: CPT | Performed by: UROLOGY

## 2019-01-01 PROCEDURE — 86850 RBC ANTIBODY SCREEN: CPT | Performed by: UROLOGY

## 2019-01-01 PROCEDURE — 84450 TRANSFERASE (AST) (SGOT): CPT

## 2019-01-01 PROCEDURE — 74177 CT ABD & PELVIS W/CONTRAST: CPT

## 2019-01-01 PROCEDURE — 80061 LIPID PANEL: CPT | Performed by: INTERNAL MEDICINE

## 2019-01-01 PROCEDURE — 83605 ASSAY OF LACTIC ACID: CPT | Performed by: EMERGENCY MEDICINE

## 2019-01-01 PROCEDURE — 88341 IMHCHEM/IMCYTCHM EA ADD ANTB: CPT | Performed by: INTERNAL MEDICINE

## 2019-01-01 PROCEDURE — 88307 TISSUE EXAM BY PATHOLOGIST: CPT | Mod: 26 | Performed by: UROLOGY

## 2019-01-01 PROCEDURE — 71000012 ZZH RECOVERY PHASE 1 LEVEL 1 FIRST HR: Performed by: UROLOGY

## 2019-01-01 PROCEDURE — 80048 BASIC METABOLIC PNL TOTAL CA: CPT | Performed by: UROLOGY

## 2019-01-01 PROCEDURE — 84460 ALANINE AMINO (ALT) (SGPT): CPT

## 2019-01-01 PROCEDURE — 80053 COMPREHEN METABOLIC PANEL: CPT | Performed by: INTERNAL MEDICINE

## 2019-01-01 PROCEDURE — 88341 IMHCHEM/IMCYTCHM EA ADD ANTB: CPT | Mod: 26,59 | Performed by: INTERNAL MEDICINE

## 2019-01-01 PROCEDURE — 93005 ELECTROCARDIOGRAM TRACING: CPT

## 2019-01-01 PROCEDURE — 37000009 ZZH ANESTHESIA TECHNICAL FEE, EACH ADDTL 15 MIN: Performed by: UROLOGY

## 2019-01-01 PROCEDURE — 99215 OFFICE O/P EST HI 40 MIN: CPT | Mod: 24 | Performed by: INTERNAL MEDICINE

## 2019-01-01 PROCEDURE — 84443 ASSAY THYROID STIM HORMONE: CPT | Performed by: INTERNAL MEDICINE

## 2019-01-01 PROCEDURE — 25000128 H RX IP 250 OP 636: Performed by: RADIOLOGY

## 2019-01-01 PROCEDURE — 88342 IMHCHEM/IMCYTCHM 1ST ANTB: CPT | Mod: 26 | Performed by: INTERNAL MEDICINE

## 2019-01-01 PROCEDURE — 99204 OFFICE O/P NEW MOD 45 MIN: CPT | Performed by: UROLOGY

## 2019-01-01 PROCEDURE — 88305 TISSUE EXAM BY PATHOLOGIST: CPT | Mod: 26 | Performed by: INTERNAL MEDICINE

## 2019-01-01 PROCEDURE — 25000566 ZZH SEVOFLURANE, EA 15 MIN: Performed by: UROLOGY

## 2019-01-01 PROCEDURE — 12000000 ZZH R&B MED SURG/OB

## 2019-01-01 PROCEDURE — 71046 X-RAY EXAM CHEST 2 VIEWS: CPT

## 2019-01-01 PROCEDURE — 88305 TISSUE EXAM BY PATHOLOGIST: CPT | Performed by: INTERNAL MEDICINE

## 2019-01-01 PROCEDURE — 25800030 ZZH RX IP 258 OP 636: Performed by: UROLOGY

## 2019-01-01 PROCEDURE — 25000128 H RX IP 250 OP 636

## 2019-01-01 PROCEDURE — 25000128 H RX IP 250 OP 636: Performed by: SURGERY

## 2019-01-01 PROCEDURE — 83615 LACTATE (LD) (LDH) ENZYME: CPT | Performed by: INTERNAL MEDICINE

## 2019-01-01 PROCEDURE — 82565 ASSAY OF CREATININE: CPT

## 2019-01-01 PROCEDURE — 40000170 ZZH STATISTIC PRE-PROCEDURE ASSESSMENT II: Performed by: UROLOGY

## 2019-01-01 PROCEDURE — 80053 COMPREHEN METABOLIC PANEL: CPT | Performed by: EMERGENCY MEDICINE

## 2019-01-01 PROCEDURE — 25000125 ZZHC RX 250: Performed by: UROLOGY

## 2019-01-01 PROCEDURE — 99024 POSTOP FOLLOW-UP VISIT: CPT | Performed by: UROLOGY

## 2019-01-01 PROCEDURE — 86900 BLOOD TYPING SEROLOGIC ABO: CPT | Performed by: UROLOGY

## 2019-01-01 PROCEDURE — 93010 ELECTROCARDIOGRAM REPORT: CPT | Performed by: INTERNAL MEDICINE

## 2019-01-01 PROCEDURE — 71260 CT THORAX DX C+: CPT

## 2019-01-01 PROCEDURE — 32405 CT LUNG MEDIASTINUM BIOPSY: CPT

## 2019-01-01 RX ORDER — IOPAMIDOL 755 MG/ML
100 INJECTION, SOLUTION INTRAVASCULAR ONCE
Status: COMPLETED | OUTPATIENT
Start: 2019-01-01 | End: 2019-01-01

## 2019-01-01 RX ORDER — ASPIRIN 81 MG
100 TABLET, DELAYED RELEASE (ENTERIC COATED) ORAL DAILY
Qty: 60 TABLET | Refills: 1 | Status: SHIPPED | OUTPATIENT
Start: 2019-01-01

## 2019-01-01 RX ORDER — ALBUTEROL SULFATE 0.83 MG/ML
2.5 SOLUTION RESPIRATORY (INHALATION)
Status: CANCELLED | OUTPATIENT
Start: 2019-01-01

## 2019-01-01 RX ORDER — PROPOFOL 10 MG/ML
INJECTION, EMULSION INTRAVENOUS CONTINUOUS PRN
Status: DISCONTINUED | OUTPATIENT
Start: 2019-01-01 | End: 2019-01-01

## 2019-01-01 RX ORDER — EPINEPHRINE 0.3 MG/.3ML
0.3 INJECTION SUBCUTANEOUS EVERY 5 MIN PRN
Status: CANCELLED | OUTPATIENT
Start: 2019-01-01

## 2019-01-01 RX ORDER — TRAMADOL HYDROCHLORIDE 50 MG/1
50 TABLET ORAL EVERY 8 HOURS PRN
Qty: 20 TABLET | Refills: 1 | Status: SHIPPED | OUTPATIENT
Start: 2019-01-01

## 2019-01-01 RX ORDER — ONDANSETRON 2 MG/ML
INJECTION INTRAMUSCULAR; INTRAVENOUS PRN
Status: DISCONTINUED | OUTPATIENT
Start: 2019-01-01 | End: 2019-01-01

## 2019-01-01 RX ORDER — HYDROMORPHONE HYDROCHLORIDE 1 MG/ML
.3-.5 INJECTION, SOLUTION INTRAMUSCULAR; INTRAVENOUS; SUBCUTANEOUS EVERY 10 MIN PRN
Status: DISCONTINUED | OUTPATIENT
Start: 2019-01-01 | End: 2019-01-01 | Stop reason: HOSPADM

## 2019-01-01 RX ORDER — LORAZEPAM 2 MG/ML
0.5 INJECTION INTRAMUSCULAR EVERY 4 HOURS PRN
Status: CANCELLED
Start: 2019-01-01

## 2019-01-01 RX ORDER — MAGNESIUM HYDROXIDE 1200 MG/15ML
LIQUID ORAL PRN
Status: DISCONTINUED | OUTPATIENT
Start: 2019-01-01 | End: 2019-01-01 | Stop reason: HOSPADM

## 2019-01-01 RX ORDER — MEPERIDINE HYDROCHLORIDE 25 MG/ML
25 INJECTION INTRAMUSCULAR; INTRAVENOUS; SUBCUTANEOUS EVERY 30 MIN PRN
Status: CANCELLED | OUTPATIENT
Start: 2019-01-01

## 2019-01-01 RX ORDER — METHYLPREDNISOLONE SODIUM SUCCINATE 125 MG/2ML
125 INJECTION, POWDER, LYOPHILIZED, FOR SOLUTION INTRAMUSCULAR; INTRAVENOUS
Status: CANCELLED
Start: 2019-01-01

## 2019-01-01 RX ORDER — FENTANYL CITRATE 50 UG/ML
25-50 INJECTION, SOLUTION INTRAMUSCULAR; INTRAVENOUS
Status: DISCONTINUED | OUTPATIENT
Start: 2019-01-01 | End: 2019-01-01 | Stop reason: HOSPADM

## 2019-01-01 RX ORDER — NALOXONE HYDROCHLORIDE 0.4 MG/ML
.1-.4 INJECTION, SOLUTION INTRAMUSCULAR; INTRAVENOUS; SUBCUTANEOUS
Status: CANCELLED | OUTPATIENT
Start: 2019-01-01

## 2019-01-01 RX ORDER — CEFAZOLIN SODIUM 2 G/100ML
2 INJECTION, SOLUTION INTRAVENOUS
Status: COMPLETED | OUTPATIENT
Start: 2019-01-01 | End: 2019-01-01

## 2019-01-01 RX ORDER — SODIUM CHLORIDE 9 MG/ML
1000 INJECTION, SOLUTION INTRAVENOUS CONTINUOUS PRN
Status: CANCELLED
Start: 2019-01-01

## 2019-01-01 RX ORDER — ALBUTEROL SULFATE 90 UG/1
2 AEROSOL, METERED RESPIRATORY (INHALATION) EVERY 6 HOURS PRN
Qty: 3 INHALER | Refills: 3 | Status: SHIPPED | OUTPATIENT
Start: 2019-01-01

## 2019-01-01 RX ORDER — LIDOCAINE 40 MG/G
CREAM TOPICAL
Status: DISCONTINUED | OUTPATIENT
Start: 2019-01-01 | End: 2019-01-01 | Stop reason: HOSPADM

## 2019-01-01 RX ORDER — CEFAZOLIN SODIUM 1 G/3ML
1 INJECTION, POWDER, FOR SOLUTION INTRAMUSCULAR; INTRAVENOUS SEE ADMIN INSTRUCTIONS
Status: DISCONTINUED | OUTPATIENT
Start: 2019-01-01 | End: 2019-01-01 | Stop reason: HOSPADM

## 2019-01-01 RX ORDER — OXYCODONE HYDROCHLORIDE 5 MG/1
5 TABLET ORAL EVERY 6 HOURS PRN
Qty: 20 TABLET | Refills: 0 | Status: SHIPPED | OUTPATIENT
Start: 2019-01-01

## 2019-01-01 RX ORDER — ACETAMINOPHEN 325 MG/1
975 TABLET ORAL EVERY 6 HOURS
Status: DISCONTINUED | OUTPATIENT
Start: 2019-01-01 | End: 2019-01-01 | Stop reason: HOSPADM

## 2019-01-01 RX ORDER — LIDOCAINE HYDROCHLORIDE 20 MG/ML
INJECTION, SOLUTION INFILTRATION; PERINEURAL PRN
Status: DISCONTINUED | OUTPATIENT
Start: 2019-01-01 | End: 2019-01-01

## 2019-01-01 RX ORDER — PROCHLORPERAZINE MALEATE 10 MG
10 TABLET ORAL EVERY 6 HOURS PRN
Status: DISCONTINUED | OUTPATIENT
Start: 2019-01-01 | End: 2019-01-01 | Stop reason: HOSPADM

## 2019-01-01 RX ORDER — LISINOPRIL 20 MG/1
20 TABLET ORAL DAILY
Qty: 90 TABLET | Refills: 1 | Status: SHIPPED | OUTPATIENT
Start: 2019-01-01 | End: 2019-01-01

## 2019-01-01 RX ORDER — ALBUTEROL SULFATE 90 UG/1
AEROSOL, METERED RESPIRATORY (INHALATION) PRN
Status: DISCONTINUED | OUTPATIENT
Start: 2019-01-01 | End: 2019-01-01

## 2019-01-01 RX ORDER — SODIUM CHLORIDE, SODIUM LACTATE, POTASSIUM CHLORIDE, CALCIUM CHLORIDE 600; 310; 30; 20 MG/100ML; MG/100ML; MG/100ML; MG/100ML
INJECTION, SOLUTION INTRAVENOUS CONTINUOUS
Status: DISCONTINUED | OUTPATIENT
Start: 2019-01-01 | End: 2019-01-01 | Stop reason: HOSPADM

## 2019-01-01 RX ORDER — HYDROMORPHONE HYDROCHLORIDE 1 MG/ML
0.2 INJECTION, SOLUTION INTRAMUSCULAR; INTRAVENOUS; SUBCUTANEOUS
Status: DISCONTINUED | OUTPATIENT
Start: 2019-01-01 | End: 2019-01-01 | Stop reason: HOSPADM

## 2019-01-01 RX ORDER — MEPERIDINE HYDROCHLORIDE 25 MG/ML
12.5 INJECTION INTRAMUSCULAR; INTRAVENOUS; SUBCUTANEOUS
Status: DISCONTINUED | OUTPATIENT
Start: 2019-01-01 | End: 2019-01-01 | Stop reason: HOSPADM

## 2019-01-01 RX ORDER — ALBUTEROL SULFATE 90 UG/1
2 AEROSOL, METERED RESPIRATORY (INHALATION) EVERY 6 HOURS PRN
Qty: 3 INHALER | Refills: 3 | Status: SHIPPED | OUTPATIENT
Start: 2019-01-01 | End: 2019-01-01

## 2019-01-01 RX ORDER — INFLUENZA A VIRUS A/GUANGDONG-MAONAN/SWL1536/2019 CNIC-1909 (H1N1) ANTIGEN (FORMALDEHYDE INACTIVATED), INFLUENZA A VIRUS A/HONG KONG/2671/2019 (H3N2) ANTIGEN (FORMALDEHYDE INACTIVATED), INFLUENZA B VIRUS B/PHUKET/3073/2013 ANTIGEN (FORMALDEHYDE INACTIVATED), AND INFLUENZA B VIRUS B/WASHINGTON/02/2019 ANTIGEN (FORMALDEHYDE INACTIVATED) 15; 15; 15; 15 UG/.5ML; UG/.5ML; UG/.5ML; UG/.5ML
INJECTION, SUSPENSION INTRAMUSCULAR
Refills: 0 | COMMUNITY
Start: 2018-11-06

## 2019-01-01 RX ORDER — NEOSTIGMINE METHYLSULFATE 1 MG/ML
VIAL (ML) INJECTION PRN
Status: DISCONTINUED | OUTPATIENT
Start: 2019-01-01 | End: 2019-01-01

## 2019-01-01 RX ORDER — SODIUM CHLORIDE, SODIUM LACTATE, POTASSIUM CHLORIDE, CALCIUM CHLORIDE 600; 310; 30; 20 MG/100ML; MG/100ML; MG/100ML; MG/100ML
INJECTION, SOLUTION INTRAVENOUS CONTINUOUS PRN
Status: DISCONTINUED | OUTPATIENT
Start: 2019-01-01 | End: 2019-01-01

## 2019-01-01 RX ORDER — LIDOCAINE HYDROCHLORIDE 10 MG/ML
INJECTION, SOLUTION INFILTRATION; PERINEURAL
Status: COMPLETED
Start: 2019-01-01 | End: 2019-01-01

## 2019-01-01 RX ORDER — AMOXICILLIN 250 MG
1 CAPSULE ORAL 2 TIMES DAILY PRN
Status: DISCONTINUED | OUTPATIENT
Start: 2019-01-01 | End: 2019-01-01 | Stop reason: HOSPADM

## 2019-01-01 RX ORDER — EPHEDRINE SULFATE 50 MG/ML
INJECTION, SOLUTION INTRAMUSCULAR; INTRAVENOUS; SUBCUTANEOUS PRN
Status: DISCONTINUED | OUTPATIENT
Start: 2019-01-01 | End: 2019-01-01

## 2019-01-01 RX ORDER — DIPHENHYDRAMINE HYDROCHLORIDE 50 MG/ML
50 INJECTION INTRAMUSCULAR; INTRAVENOUS
Status: CANCELLED
Start: 2019-01-01

## 2019-01-01 RX ORDER — OXYCODONE HYDROCHLORIDE 5 MG/1
5 TABLET ORAL EVERY 6 HOURS PRN
Qty: 9 TABLET | Refills: 0 | Status: SHIPPED | OUTPATIENT
Start: 2019-01-01 | End: 2019-01-01

## 2019-01-01 RX ORDER — FENTANYL CITRATE 50 UG/ML
INJECTION, SOLUTION INTRAMUSCULAR; INTRAVENOUS PRN
Status: DISCONTINUED | OUTPATIENT
Start: 2019-01-01 | End: 2019-01-01

## 2019-01-01 RX ORDER — OXYCODONE HYDROCHLORIDE 5 MG/1
5-10 TABLET ORAL EVERY 4 HOURS PRN
Status: DISCONTINUED | OUTPATIENT
Start: 2019-01-01 | End: 2019-01-01 | Stop reason: HOSPADM

## 2019-01-01 RX ORDER — ONDANSETRON 2 MG/ML
4 INJECTION INTRAMUSCULAR; INTRAVENOUS EVERY 30 MIN PRN
Status: DISCONTINUED | OUTPATIENT
Start: 2019-01-01 | End: 2019-01-01 | Stop reason: HOSPADM

## 2019-01-01 RX ORDER — ALBUTEROL SULFATE 90 UG/1
1-2 AEROSOL, METERED RESPIRATORY (INHALATION)
Status: CANCELLED
Start: 2019-01-01

## 2019-01-01 RX ORDER — ALBUTEROL SULFATE 90 UG/1
2 AEROSOL, METERED RESPIRATORY (INHALATION) EVERY 6 HOURS PRN
Status: DISCONTINUED | OUTPATIENT
Start: 2019-01-01 | End: 2019-01-01 | Stop reason: HOSPADM

## 2019-01-01 RX ORDER — GLYCOPYRROLATE 0.2 MG/ML
INJECTION, SOLUTION INTRAMUSCULAR; INTRAVENOUS PRN
Status: DISCONTINUED | OUTPATIENT
Start: 2019-01-01 | End: 2019-01-01

## 2019-01-01 RX ORDER — ONDANSETRON 2 MG/ML
4 INJECTION INTRAMUSCULAR; INTRAVENOUS EVERY 6 HOURS PRN
Status: DISCONTINUED | OUTPATIENT
Start: 2019-01-01 | End: 2019-01-01 | Stop reason: HOSPADM

## 2019-01-01 RX ORDER — EPINEPHRINE 1 MG/ML
0.3 INJECTION, SOLUTION INTRAMUSCULAR; SUBCUTANEOUS EVERY 5 MIN PRN
Status: CANCELLED | OUTPATIENT
Start: 2019-01-01

## 2019-01-01 RX ORDER — BUPIVACAINE HYDROCHLORIDE 5 MG/ML
INJECTION, SOLUTION PERINEURAL PRN
Status: DISCONTINUED | OUTPATIENT
Start: 2019-01-01 | End: 2019-01-01 | Stop reason: HOSPADM

## 2019-01-01 RX ORDER — FENTANYL CITRATE 50 UG/ML
INJECTION, SOLUTION INTRAMUSCULAR; INTRAVENOUS
Status: COMPLETED
Start: 2019-01-01 | End: 2019-01-01

## 2019-01-01 RX ORDER — HYDROMORPHONE HCL/0.9% NACL/PF 0.2MG/0.2
0.4 SYRINGE (ML) INTRAVENOUS ONCE
Status: COMPLETED | OUTPATIENT
Start: 2019-01-01 | End: 2019-01-01

## 2019-01-01 RX ORDER — METOPROLOL TARTRATE 1 MG/ML
1-2 INJECTION, SOLUTION INTRAVENOUS EVERY 5 MIN PRN
Status: DISCONTINUED | OUTPATIENT
Start: 2019-01-01 | End: 2019-01-01 | Stop reason: HOSPADM

## 2019-01-01 RX ORDER — SODIUM CHLORIDE 9 MG/ML
INJECTION, SOLUTION INTRAVENOUS CONTINUOUS
Status: DISCONTINUED | OUTPATIENT
Start: 2019-01-01 | End: 2019-01-01 | Stop reason: HOSPADM

## 2019-01-01 RX ORDER — IBUPROFEN 200 MG
200-400 TABLET ORAL 2 TIMES DAILY PRN
Status: ON HOLD | COMMUNITY
End: 2019-01-01

## 2019-01-01 RX ORDER — VECURONIUM BROMIDE 1 MG/ML
INJECTION, POWDER, LYOPHILIZED, FOR SOLUTION INTRAVENOUS PRN
Status: DISCONTINUED | OUTPATIENT
Start: 2019-01-01 | End: 2019-01-01

## 2019-01-01 RX ORDER — CHLORAL HYDRATE 500 MG
1 CAPSULE ORAL 2 TIMES DAILY
COMMUNITY

## 2019-01-01 RX ORDER — IOPAMIDOL 755 MG/ML
500 INJECTION, SOLUTION INTRAVASCULAR ONCE
Status: DISCONTINUED | OUTPATIENT
Start: 2019-01-01 | End: 2019-01-01 | Stop reason: CLARIF

## 2019-01-01 RX ORDER — ONDANSETRON 4 MG/1
4 TABLET, ORALLY DISINTEGRATING ORAL EVERY 6 HOURS PRN
Status: DISCONTINUED | OUTPATIENT
Start: 2019-01-01 | End: 2019-01-01 | Stop reason: HOSPADM

## 2019-01-01 RX ORDER — PROPOFOL 10 MG/ML
INJECTION, EMULSION INTRAVENOUS PRN
Status: DISCONTINUED | OUTPATIENT
Start: 2019-01-01 | End: 2019-01-01

## 2019-01-01 RX ORDER — NALOXONE HYDROCHLORIDE 0.4 MG/ML
.1-.4 INJECTION, SOLUTION INTRAMUSCULAR; INTRAVENOUS; SUBCUTANEOUS
Status: DISCONTINUED | OUTPATIENT
Start: 2019-01-01 | End: 2019-01-01 | Stop reason: HOSPADM

## 2019-01-01 RX ORDER — ONDANSETRON 4 MG/1
4 TABLET, ORALLY DISINTEGRATING ORAL EVERY 30 MIN PRN
Status: DISCONTINUED | OUTPATIENT
Start: 2019-01-01 | End: 2019-01-01 | Stop reason: HOSPADM

## 2019-01-01 RX ORDER — DEXAMETHASONE SODIUM PHOSPHATE 4 MG/ML
INJECTION, SOLUTION INTRA-ARTICULAR; INTRALESIONAL; INTRAMUSCULAR; INTRAVENOUS; SOFT TISSUE PRN
Status: DISCONTINUED | OUTPATIENT
Start: 2019-01-01 | End: 2019-01-01

## 2019-01-01 RX ORDER — ACETAMINOPHEN 500 MG
500-1000 TABLET ORAL EVERY 6 HOURS PRN
COMMUNITY

## 2019-01-01 RX ADMIN — OXYCODONE HYDROCHLORIDE 10 MG: 5 TABLET ORAL at 09:08

## 2019-01-01 RX ADMIN — OXYCODONE HYDROCHLORIDE 5 MG: 5 TABLET ORAL at 04:46

## 2019-01-01 RX ADMIN — MIDAZOLAM 1 MG: 1 INJECTION INTRAMUSCULAR; INTRAVENOUS at 09:33

## 2019-01-01 RX ADMIN — SODIUM CHLORIDE, POTASSIUM CHLORIDE, SODIUM LACTATE AND CALCIUM CHLORIDE: 600; 310; 30; 20 INJECTION, SOLUTION INTRAVENOUS at 09:30

## 2019-01-01 RX ADMIN — ACETAMINOPHEN 975 MG: 325 TABLET, FILM COATED ORAL at 07:00

## 2019-01-01 RX ADMIN — SODIUM CHLORIDE, POTASSIUM CHLORIDE, SODIUM LACTATE AND CALCIUM CHLORIDE: 600; 310; 30; 20 INJECTION, SOLUTION INTRAVENOUS at 07:35

## 2019-01-01 RX ADMIN — ONDANSETRON 4 MG: 4 TABLET, ORALLY DISINTEGRATING ORAL at 10:19

## 2019-01-01 RX ADMIN — OXYCODONE HYDROCHLORIDE 5 MG: 5 TABLET ORAL at 18:21

## 2019-01-01 RX ADMIN — DEXAMETHASONE SODIUM PHOSPHATE 4 MG: 4 INJECTION, SOLUTION INTRA-ARTICULAR; INTRALESIONAL; INTRAMUSCULAR; INTRAVENOUS; SOFT TISSUE at 08:06

## 2019-01-01 RX ADMIN — SENNOSIDES AND DOCUSATE SODIUM 1 TABLET: 8.6; 5 TABLET ORAL at 06:36

## 2019-01-01 RX ADMIN — PROPOFOL 200 MG: 10 INJECTION, EMULSION INTRAVENOUS at 07:41

## 2019-01-01 RX ADMIN — HYDROMORPHONE HYDROCHLORIDE 0.2 MG: 1 INJECTION, SOLUTION INTRAMUSCULAR; INTRAVENOUS; SUBCUTANEOUS at 14:12

## 2019-01-01 RX ADMIN — SODIUM CHLORIDE: 9 INJECTION, SOLUTION INTRAVENOUS at 14:13

## 2019-01-01 RX ADMIN — OXYCODONE HYDROCHLORIDE 5 MG: 5 TABLET ORAL at 01:07

## 2019-01-01 RX ADMIN — LIDOCAINE HYDROCHLORIDE 10 ML: 10 INJECTION, SOLUTION INFILTRATION; PERINEURAL at 09:45

## 2019-01-01 RX ADMIN — ALBUTEROL SULFATE 8 PUFF: 90 AEROSOL, METERED RESPIRATORY (INHALATION) at 10:24

## 2019-01-01 RX ADMIN — NEOSTIGMINE METHYLSULFATE 3.5 MG: 1 INJECTION, SOLUTION INTRAVENOUS at 10:28

## 2019-01-01 RX ADMIN — ROCURONIUM BROMIDE 50 MG: 10 INJECTION INTRAVENOUS at 07:41

## 2019-01-01 RX ADMIN — VECURONIUM BROMIDE 2 MG: 1 INJECTION, POWDER, LYOPHILIZED, FOR SOLUTION INTRAVENOUS at 08:58

## 2019-01-01 RX ADMIN — OXYCODONE HYDROCHLORIDE 5 MG: 5 TABLET ORAL at 22:14

## 2019-01-01 RX ADMIN — SENNOSIDES AND DOCUSATE SODIUM 1 TABLET: 8.6; 5 TABLET ORAL at 17:56

## 2019-01-01 RX ADMIN — MIDAZOLAM 1 MG: 1 INJECTION INTRAMUSCULAR; INTRAVENOUS at 09:58

## 2019-01-01 RX ADMIN — Medication 5 MG: at 07:57

## 2019-01-01 RX ADMIN — GLYCOPYRROLATE 0.7 MG: 0.2 INJECTION, SOLUTION INTRAMUSCULAR; INTRAVENOUS at 10:28

## 2019-01-01 RX ADMIN — MIDAZOLAM 1 MG: 1 INJECTION INTRAMUSCULAR; INTRAVENOUS at 09:39

## 2019-01-01 RX ADMIN — FENTANYL CITRATE 50 MCG: 50 INJECTION INTRAMUSCULAR; INTRAVENOUS at 09:38

## 2019-01-01 RX ADMIN — PROPOFOL 25 MCG/KG/MIN: 10 INJECTION, EMULSION INTRAVENOUS at 07:41

## 2019-01-01 RX ADMIN — SODIUM CHLORIDE: 9 INJECTION, SOLUTION INTRAVENOUS at 19:00

## 2019-01-01 RX ADMIN — FENTANYL CITRATE 50 MCG: 50 INJECTION INTRAMUSCULAR; INTRAVENOUS at 09:57

## 2019-01-01 RX ADMIN — ONDANSETRON 4 MG: 2 INJECTION INTRAMUSCULAR; INTRAVENOUS at 10:04

## 2019-01-01 RX ADMIN — ACETAMINOPHEN 975 MG: 325 TABLET, FILM COATED ORAL at 18:21

## 2019-01-01 RX ADMIN — ACETAMINOPHEN 975 MG: 325 TABLET, FILM COATED ORAL at 14:11

## 2019-01-01 RX ADMIN — SENNOSIDES AND DOCUSATE SODIUM 1 TABLET: 8.6; 5 TABLET ORAL at 09:09

## 2019-01-01 RX ADMIN — FENTANYL CITRATE 50 MCG: 50 INJECTION INTRAMUSCULAR; INTRAVENOUS at 09:33

## 2019-01-01 RX ADMIN — FENTANYL CITRATE 100 MCG: 50 INJECTION, SOLUTION INTRAMUSCULAR; INTRAVENOUS at 07:41

## 2019-01-01 RX ADMIN — MEPERIDINE HYDROCHLORIDE 12.5 MG: 25 INJECTION INTRAMUSCULAR; INTRAVENOUS; SUBCUTANEOUS at 10:43

## 2019-01-01 RX ADMIN — VECURONIUM BROMIDE 3 MG: 1 INJECTION, POWDER, LYOPHILIZED, FOR SOLUTION INTRAVENOUS at 08:29

## 2019-01-01 RX ADMIN — HYDROMORPHONE HYDROCHLORIDE 0.5 MG: 1 INJECTION, SOLUTION INTRAMUSCULAR; INTRAVENOUS; SUBCUTANEOUS at 11:01

## 2019-01-01 RX ADMIN — PHENYLEPHRINE HYDROCHLORIDE 50 MCG: 10 INJECTION INTRAVENOUS at 10:08

## 2019-01-01 RX ADMIN — ONDANSETRON 4 MG: 4 TABLET, ORALLY DISINTEGRATING ORAL at 12:06

## 2019-01-01 RX ADMIN — PHENYLEPHRINE HYDROCHLORIDE 50 MCG: 10 INJECTION INTRAVENOUS at 08:22

## 2019-01-01 RX ADMIN — VECURONIUM BROMIDE 1 MG: 1 INJECTION, POWDER, LYOPHILIZED, FOR SOLUTION INTRAVENOUS at 08:12

## 2019-01-01 RX ADMIN — IOPAMIDOL 82 ML: 755 INJECTION, SOLUTION INTRAVENOUS at 07:27

## 2019-01-01 RX ADMIN — SODIUM CHLORIDE 60 ML: 9 INJECTION, SOLUTION INTRAVENOUS at 07:27

## 2019-01-01 RX ADMIN — LIDOCAINE HYDROCHLORIDE 80 MG: 20 INJECTION, SOLUTION INFILTRATION; PERINEURAL at 07:41

## 2019-01-01 RX ADMIN — CEFAZOLIN SODIUM 2 G: 2 INJECTION, SOLUTION INTRAVENOUS at 07:46

## 2019-01-01 RX ADMIN — MIDAZOLAM 0.5 MG: 1 INJECTION INTRAMUSCULAR; INTRAVENOUS at 09:44

## 2019-01-01 RX ADMIN — VECURONIUM BROMIDE 1 MG: 1 INJECTION, POWDER, LYOPHILIZED, FOR SOLUTION INTRAVENOUS at 09:49

## 2019-01-01 RX ADMIN — OXYCODONE HYDROCHLORIDE 10 MG: 5 TABLET ORAL at 17:51

## 2019-01-01 RX ADMIN — HYDROMORPHONE HYDROCHLORIDE 0.5 MG: 1 INJECTION, SOLUTION INTRAMUSCULAR; INTRAVENOUS; SUBCUTANEOUS at 11:11

## 2019-01-01 RX ADMIN — OXYCODONE HYDROCHLORIDE 10 MG: 5 TABLET ORAL at 13:46

## 2019-01-01 RX ADMIN — OXYCODONE HYDROCHLORIDE 10 MG: 5 TABLET ORAL at 10:57

## 2019-01-01 RX ADMIN — VECURONIUM BROMIDE 2 MG: 1 INJECTION, POWDER, LYOPHILIZED, FOR SOLUTION INTRAVENOUS at 08:04

## 2019-01-01 RX ADMIN — FENTANYL CITRATE 25 MCG: 50 INJECTION INTRAMUSCULAR; INTRAVENOUS at 09:43

## 2019-01-01 RX ADMIN — ACETAMINOPHEN 975 MG: 325 TABLET, FILM COATED ORAL at 12:06

## 2019-01-01 RX ADMIN — Medication 0.4 MG: at 10:52

## 2019-01-01 RX ADMIN — CEFAZOLIN SODIUM 1 G: 2 INJECTION, SOLUTION INTRAVENOUS at 09:45

## 2019-01-01 RX ADMIN — SENNOSIDES AND DOCUSATE SODIUM 1 TABLET: 8.6; 5 TABLET ORAL at 22:14

## 2019-01-01 RX ADMIN — VECURONIUM BROMIDE 1 MG: 1 INJECTION, POWDER, LYOPHILIZED, FOR SOLUTION INTRAVENOUS at 09:27

## 2019-01-01 RX ADMIN — ACETAMINOPHEN 975 MG: 325 TABLET, FILM COATED ORAL at 02:09

## 2019-01-01 RX ADMIN — ACETAMINOPHEN 975 MG: 325 TABLET, FILM COATED ORAL at 01:07

## 2019-01-01 RX ADMIN — ACETAMINOPHEN 975 MG: 325 TABLET, FILM COATED ORAL at 20:20

## 2019-01-01 RX ADMIN — MIDAZOLAM 2 MG: 1 INJECTION INTRAMUSCULAR; INTRAVENOUS at 07:35

## 2019-01-01 RX ADMIN — HYDROMORPHONE HYDROCHLORIDE 0.5 MG: 1 INJECTION, SOLUTION INTRAMUSCULAR; INTRAVENOUS; SUBCUTANEOUS at 10:02

## 2019-01-01 RX ADMIN — ACETAMINOPHEN 975 MG: 325 TABLET, FILM COATED ORAL at 06:36

## 2019-01-01 RX ADMIN — PHENYLEPHRINE HYDROCHLORIDE 100 MCG: 10 INJECTION INTRAVENOUS at 08:20

## 2019-01-01 ASSESSMENT — ENCOUNTER SYMPTOMS: DYSRHYTHMIAS: 0

## 2019-01-01 ASSESSMENT — MIFFLIN-ST. JEOR
SCORE: 1459.03
SCORE: 1490.78
SCORE: 1491.68
SCORE: 1513.46
SCORE: 1549.19

## 2019-01-01 ASSESSMENT — PAIN SCALES - GENERAL
PAINLEVEL: NO PAIN (0)
PAINLEVEL: NO PAIN (0)
PAINLEVEL: MODERATE PAIN (5)
PAINLEVEL: NO PAIN (1)
PAINLEVEL: NO PAIN (0)

## 2019-01-01 ASSESSMENT — ACTIVITIES OF DAILY LIVING (ADL)
ADLS_ACUITY_SCORE: 12

## 2019-01-09 NOTE — ADDENDUM NOTE
Addended by: ANTONI CENTENO on: 4/16/2018 08:43 AM     Modules accepted: Orders    
The patient is a 17y Female complaining of suicidal thoughts.

## 2019-04-17 ENCOUNTER — OFFICE VISIT (OUTPATIENT)
Dept: INTERNAL MEDICINE | Facility: CLINIC | Age: 59
End: 2019-04-17
Payer: COMMERCIAL

## 2019-04-17 VITALS
SYSTOLIC BLOOD PRESSURE: 120 MMHG | RESPIRATION RATE: 18 BRPM | TEMPERATURE: 97.6 F | OXYGEN SATURATION: 100 % | WEIGHT: 161 LBS | DIASTOLIC BLOOD PRESSURE: 70 MMHG | HEART RATE: 58 BPM | HEIGHT: 67 IN | BODY MASS INDEX: 25.27 KG/M2

## 2019-04-17 DIAGNOSIS — Z23 NEED FOR TDAP VACCINATION: ICD-10-CM

## 2019-04-17 DIAGNOSIS — Z00.00 ENCOUNTER FOR PREVENTATIVE ADULT HEALTH CARE EXAMINATION: Primary | ICD-10-CM

## 2019-04-17 PROCEDURE — 90715 TDAP VACCINE 7 YRS/> IM: CPT | Performed by: INTERNAL MEDICINE

## 2019-04-17 PROCEDURE — 90471 IMMUNIZATION ADMIN: CPT | Performed by: INTERNAL MEDICINE

## 2019-04-17 PROCEDURE — 99396 PREV VISIT EST AGE 40-64: CPT | Performed by: INTERNAL MEDICINE

## 2019-04-17 ASSESSMENT — ENCOUNTER SYMPTOMS
ABDOMINAL PAIN: 0
SORE THROAT: 0
NAUSEA: 0
DIARRHEA: 0
WEAKNESS: 0
FREQUENCY: 0
PARESTHESIAS: 0
PALPITATIONS: 0
CHILLS: 0
HEMATOCHEZIA: 1
HEADACHES: 0
MYALGIAS: 0
FEVER: 0
EYE PAIN: 0
COUGH: 0
CONSTIPATION: 0
DIZZINESS: 0
DYSURIA: 0
JOINT SWELLING: 0
HEARTBURN: 0
HEMATURIA: 0
ARTHRALGIAS: 1
NERVOUS/ANXIOUS: 0
SHORTNESS OF BREATH: 0

## 2019-04-17 ASSESSMENT — MIFFLIN-ST. JEOR: SCORE: 1508.92

## 2019-04-17 ASSESSMENT — PAIN SCALES - GENERAL: PAINLEVEL: NO PAIN (0)

## 2019-04-17 NOTE — PROGRESS NOTES
SUBJECTIVE:   CC: Jesus Haley is an 58 year old male who presents for preventative health visit.     Healthy Habits:     Getting at least 3 servings of Calcium per day:  Yes    Bi-annual eye exam:  Yes    Dental care twice a year:  Yes    Sleep apnea or symptoms of sleep apnea:  None    Diet:  Regular (no restrictions)    Frequency of exercise:  6-7 days/week    Duration of exercise:  Greater than 60 minutes    Taking medications regularly:  Yes    Medication side effects:  None    PHQ-2 Total Score: 0    Additional concerns today:  No          PROBLEMS TO ADD ON...  Reports occasional twinges of chest pain, localized left lower anterior chest not related to exercise.   Has had occasional hemorrhoids, bleeding.     Today's PHQ-2 Score:   PHQ-2 ( 1999 Pfizer) 4/17/2019   Q1: Little interest or pleasure in doing things -   Q2: Feeling down, depressed or hopeless -   PHQ-2 Score -   Q1: Little interest or pleasure in doing things -   Q2: Feeling down, depressed or hopeless -   PHQ-2 Score Incomplete       Abuse: Current or Past(Physical, Sexual or Emotional)- No  Do you feel safe in your environment? Yes    Social History     Tobacco Use     Smoking status: Never Smoker     Smokeless tobacco: Never Used   Substance Use Topics     Alcohol use: No     If you drink alcohol do you typically have >3 drinks per day or >7 drinks per week? No    Alcohol Use 4/16/2018   Prescreen: >3 drinks/day or >7 drinks/week? No       Last PSA:   PSA   Date Value Ref Range Status   04/16/2018 0.30 0 - 4 ug/L Final     Comment:     Assay Method:  Chemiluminescence using Siemens Vista analyzer       Reviewed orders with patient. Reviewed health maintenance and updated orders accordingly - Yes  Labs reviewed in EPIC  BP Readings from Last 3 Encounters:   04/17/19 120/70   04/16/18 142/80   09/15/04 130/82    Wt Readings from Last 3 Encounters:   04/17/19 73 kg (161 lb)   04/16/18 75.3 kg (166 lb)   09/15/04 78.5 kg (173 lb)                     Reviewed and updated as needed this visit by clinical staff         Reviewed and updated as needed this visit by Provider            Review of Systems   Constitutional: Negative for chills and fever.   HENT: Positive for congestion, ear pain and hearing loss. Negative for sore throat.    Eyes: Negative for pain and visual disturbance.   Respiratory: Negative for cough and shortness of breath.    Cardiovascular: Positive for peripheral edema. Negative for chest pain and palpitations.   Gastrointestinal: Positive for hematochezia. Negative for abdominal pain, constipation, diarrhea, heartburn and nausea.   Genitourinary: Negative for discharge, dysuria, frequency, genital sores, hematuria, impotence and urgency.   Musculoskeletal: Positive for arthralgias. Negative for joint swelling and myalgias.   Skin: Negative for rash.   Neurological: Negative for dizziness, weakness, headaches and paresthesias.   Psychiatric/Behavioral: Negative for mood changes. The patient is not nervous/anxious.          OBJECTIVE:   There were no vitals taken for this visit.    Physical Exam  GENERAL: healthy, alert and no distress  EYES: Eyes grossly normal to inspection, PERRL and conjunctivae and sclerae normal  HENT: ear canals and TM's normal, nose and mouth without ulcers or lesions  NECK: no adenopathy, no asymmetry, masses, or scars and thyroid normal to palpation  RESP: lungs clear to auscultation - no rales, rhonchi or wheezes  CV: regular rate and rhythm, normal S1 S2, no S3 or S4, no murmur, click or rub, no peripheral edema and peripheral pulses strong  ABDOMEN: soft, nontender, no hepatosplenomegaly, no masses and bowel sounds normal  RECTAL: normal sphincter tone, no rectal masses, prostate normal size, smooth, nontender without nodules or masses  MS: no gross musculoskeletal defects noted, no edema  SKIN: no suspicious lesions or rashes  NEURO: Normal strength and tone, mentation intact and speech normal  PSYCH: mentation  "appears normal, affect normal/bright    Diagnostic Test Results:  none     ASSESSMENT/PLAN:       ICD-10-CM    1. Encounter for preventative adult health care examination Z00.00 CBC with platelets     Comprehensive metabolic panel     Lipid panel reflex to direct LDL Fasting     TSH with free T4 reflex     Prostate spec antigen screen     *UA reflex to Microscopic     Fecal colorectal cancer screen FIT     Atypical chest pain  Assess lab   Assess FIT     COUNSELING:   Reviewed preventive health counseling, as reflected in patient instructions       Regular exercise       Healthy diet/nutrition       Vision screening       Hearing screening       Immunizations    Vaccinated for: TDAP             Colon cancer screening       Prostate cancer screening    BP Readings from Last 1 Encounters:   04/16/18 142/80     Estimated body mass index is 25.24 kg/m  as calculated from the following:    Height as of 4/16/18: 1.727 m (5' 8\").    Weight as of 4/16/18: 75.3 kg (166 lb).           reports that he has never smoked. He has never used smokeless tobacco.      Counseling Resources:  ATP IV Guidelines  Pooled Cohorts Equation Calculator  FRAX Risk Assessment  ICSI Preventive Guidelines  Dietary Guidelines for Americans, 2010  USDA's MyPlate  ASA Prophylaxis  Lung CA Screening    Eliot Jay MD  [facility]  "

## 2019-04-23 PROCEDURE — 82274 ASSAY TEST FOR BLOOD FECAL: CPT | Performed by: INTERNAL MEDICINE

## 2019-04-24 DIAGNOSIS — Z00.00 ENCOUNTER FOR PREVENTATIVE ADULT HEALTH CARE EXAMINATION: ICD-10-CM

## 2019-04-24 LAB
ALBUMIN SERPL-MCNC: 4 G/DL (ref 3.4–5)
ALBUMIN UR-MCNC: NEGATIVE MG/DL
ALP SERPL-CCNC: 65 U/L (ref 40–150)
ALT SERPL W P-5'-P-CCNC: 27 U/L (ref 0–70)
ANION GAP SERPL CALCULATED.3IONS-SCNC: 3 MMOL/L (ref 3–14)
APPEARANCE UR: CLEAR
AST SERPL W P-5'-P-CCNC: 22 U/L (ref 0–45)
BILIRUB SERPL-MCNC: 0.5 MG/DL (ref 0.2–1.3)
BILIRUB UR QL STRIP: NEGATIVE
BUN SERPL-MCNC: 19 MG/DL (ref 7–30)
CALCIUM SERPL-MCNC: 9.2 MG/DL (ref 8.5–10.1)
CHLORIDE SERPL-SCNC: 103 MMOL/L (ref 94–109)
CHOLEST SERPL-MCNC: 228 MG/DL
CO2 SERPL-SCNC: 31 MMOL/L (ref 20–32)
COLOR UR AUTO: YELLOW
CREAT SERPL-MCNC: 0.81 MG/DL (ref 0.66–1.25)
ERYTHROCYTE [DISTWIDTH] IN BLOOD BY AUTOMATED COUNT: 13.1 % (ref 10–15)
GFR SERPL CREATININE-BSD FRML MDRD: >90 ML/MIN/{1.73_M2}
GLUCOSE SERPL-MCNC: 93 MG/DL (ref 70–99)
GLUCOSE UR STRIP-MCNC: NEGATIVE MG/DL
HCT VFR BLD AUTO: 41.1 % (ref 40–53)
HDLC SERPL-MCNC: 78 MG/DL
HGB BLD-MCNC: 13.2 G/DL (ref 13.3–17.7)
HGB UR QL STRIP: NEGATIVE
KETONES UR STRIP-MCNC: NEGATIVE MG/DL
LDLC SERPL CALC-MCNC: 141 MG/DL
LEUKOCYTE ESTERASE UR QL STRIP: NEGATIVE
MCH RBC QN AUTO: 29 PG (ref 26.5–33)
MCHC RBC AUTO-ENTMCNC: 32.1 G/DL (ref 31.5–36.5)
MCV RBC AUTO: 90 FL (ref 78–100)
NITRATE UR QL: NEGATIVE
NONHDLC SERPL-MCNC: 150 MG/DL
PH UR STRIP: 7.5 PH (ref 5–7)
PLATELET # BLD AUTO: 315 10E9/L (ref 150–450)
POTASSIUM SERPL-SCNC: 4.1 MMOL/L (ref 3.4–5.3)
PROT SERPL-MCNC: 7.7 G/DL (ref 6.8–8.8)
RBC # BLD AUTO: 4.55 10E12/L (ref 4.4–5.9)
SODIUM SERPL-SCNC: 137 MMOL/L (ref 133–144)
SOURCE: ABNORMAL
SP GR UR STRIP: 1.01 (ref 1–1.03)
TRIGL SERPL-MCNC: 45 MG/DL
TSH SERPL DL<=0.005 MIU/L-ACNC: 0.9 MU/L (ref 0.4–4)
UROBILINOGEN UR STRIP-ACNC: 0.2 EU/DL (ref 0.2–1)
WBC # BLD AUTO: 5.4 10E9/L (ref 4–11)

## 2019-04-24 PROCEDURE — 36415 COLL VENOUS BLD VENIPUNCTURE: CPT | Performed by: INTERNAL MEDICINE

## 2019-04-24 PROCEDURE — 85027 COMPLETE CBC AUTOMATED: CPT | Performed by: INTERNAL MEDICINE

## 2019-04-24 PROCEDURE — 81003 URINALYSIS AUTO W/O SCOPE: CPT | Performed by: INTERNAL MEDICINE

## 2019-04-24 PROCEDURE — 84443 ASSAY THYROID STIM HORMONE: CPT | Performed by: INTERNAL MEDICINE

## 2019-04-24 PROCEDURE — 80061 LIPID PANEL: CPT | Performed by: INTERNAL MEDICINE

## 2019-04-24 PROCEDURE — G0103 PSA SCREENING: HCPCS | Performed by: INTERNAL MEDICINE

## 2019-04-24 PROCEDURE — 80053 COMPREHEN METABOLIC PANEL: CPT | Performed by: INTERNAL MEDICINE

## 2019-04-25 LAB — PSA SERPL-ACNC: 0.27 UG/L (ref 0–4)

## 2019-04-27 DIAGNOSIS — Z00.00 ENCOUNTER FOR PREVENTATIVE ADULT HEALTH CARE EXAMINATION: ICD-10-CM

## 2019-04-27 LAB — HEMOCCULT STL QL IA: NEGATIVE

## 2019-07-18 NOTE — TELEPHONE ENCOUNTER
Jesus is in [place] and was calling to make an appointment for early next week.  Scheduling put him through to nurse line to be assessed.    Being that [place] is out of State, I wasn't able to do full triage.  Jesus reports that this past February he felt like he had an upper respiratory infection that hung on a good 8 weeks.  He wasn't seen for it at the time.  He now has a cough and some tightness with his airway, in the mornings he coughs up a lot of phlegm.      Jesus is speaking in clear full sentences, no wheezing is heard on phone.  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    Reviewed sxs of PE, when to be seen in ER and when to call 911.  If anything gets worse, be seen locally in [place].    Warm transfer to scheduling to make an appointment early part of next week.    Marguerite Raphael RN  [facility]

## 2019-07-22 PROBLEM — I10 ESSENTIAL HYPERTENSION, BENIGN: Status: ACTIVE | Noted: 2019-01-01

## 2019-07-22 NOTE — PROGRESS NOTES
Subjective     Jesus Haley is a 58 year old male who presents to clinic today for the following health issues:    HPI     Patient normally seen at [facility] and unable to make a timely appointment thus was scheduled at [facility] internal medicine clinic.    Patient states that he developed an upper respiratory viral-like syndrome in January that resolved and then became ill again.  Since then he has had intermittent issues of feeling somewhat wheezy with chest congestion and a little bit of cough.  He is had no fevers or chills.  He is a non-smoker.  He does have an extensive family history of hypertension and reports himself having labile hypertension for quite some time dating back 10 years.  He has been taking Advil 3 times a day for the last 2 weeks and he feels that this does help his symptoms.  He states he has used an inhaler in the distant past and is still very active cycling on a daily basis without symptoms interference.    RESPIRATORY SYMPTOMS      Duration: 1 month     Description  Wheezing, chest congestion when sitting up right, heavy breathing, discomfort with breathing deeply      Severity: mild     Accompanying signs and symptoms: None    History (predisposing factors):  Began after URI sx    Precipitating or alleviating factors: Patient cycles daily 40-50 miles daily- breathing has not interfered     Therapies tried and outcome:  Advil, helps     Patient Active Problem List   Diagnosis     Routine general medical examination at a health care facility     Past Surgical History:   Procedure Laterality Date     collar bone fracture Left 2001     SHOULDER SURGERY Left 1981    dislocated shoulder        Social History     Tobacco Use     Smoking status: Never Smoker     Smokeless tobacco: Never Used   Substance Use Topics     Alcohol use: No     Family History   Problem Relation Age of Onset     Diabetes Father         diet and alcohol related         No current outpatient  medications on file.     Allergies   Allergen Reactions     No Known Allergies      Zyrtec [Cetirizine]      Difficulty breathing     BP Readings from Last 3 Encounters:   04/17/19 120/70   04/16/18 142/80   09/15/04 130/82    Wt Readings from Last 3 Encounters:   04/17/19 73 kg (161 lb)   04/16/18 75.3 kg (166 lb)   09/15/04 78.5 kg (173 lb)           Reviewed and updated as needed this visit by Provider         Review of Systems   ROS COMP: CONSTITUTIONAL: NEGATIVE for fever, chills, change in weight  ENT/MOUTH: NEGATIVE for ear, mouth and throat problems  CV: NEGATIVE for chest pain, palpitations or peripheral edema  GI: NEGATIVE for nausea, abdominal pain, heartburn, or change in bowel habits  : NEGATIVE for frequency, dysuria, or hematuria  MUSCULOSKELETAL: NEGATIVE for significant arthralgias or myalgia  NEURO: NEGATIVE for weakness, dizziness or paresthesias  HEME: NEGATIVE for bleeding problems  PSYCHIATRIC: NEGATIVE for changes in mood or affect      Objective    /86   Pulse 63   Temp 97.9  F (36.6  C) (Oral)   Resp 16   Wt 73.9 kg (163 lb)   SpO2 96%   BMI 25.53 kg/m    Body mass index is 25.53 kg/m . 155/85 recheck  Physical Exam   GENERAL: healthy, alert and no distress  EYES: Eyes grossly normal to inspection, PERRL and conjunctivae and sclerae normal  HENT: ear canals and TM's normal, nose and mouth without ulcers or lesions  NECK: no adenopathy, no asymmetry, masses, or scars and thyroid normal to palpation  RESP: lungs clear to auscultation - no rales, rhonchi or wheezes  CV: regular rate and rhythm, normal S1 S2, no S3 or S4, no murmur, click or rub, no peripheral edema and peripheral pulses strong.  MS: no gross musculoskeletal defects noted  NEURO: No focal changes  PSYCH: mentation appears normal, affect normal/bright        Assessment & Plan     1. Viral upper respiratory tract infection  No focal changes on exam post viral syndrome.  Question mild reactive airway symptoms suggest  "trial of inhaler and check chest x-ray to rule out secondary cause for atypical symptoms such as adenopathy.  - albuterol (PROAIR HFA/PROVENTIL HFA/VENTOLIN HFA) 108 (90 Base) MCG/ACT inhaler; Inhale 2 puffs into the lungs every 6 hours as needed for shortness of breath / dyspnea or wheezing  Dispense: 3 Inhaler; Refill: 3  - XR Chest 2 Views; Future    Called and inform patient of chest x-ray results and CT scan of chest has been ordered for further assessment.  Discussed results of chest x-ray with patient.  Informed patient that if he does not hear on scheduling of test to contact me in 1 to 2 days.    Addendum #2: Patient was called and informed of CT scan results and concern for renal cell carcinoma with metastasis to lungs.  I also called hematology and oncology and discussed with clinic that they will call patient to make an appointment as soon as possible.  All questions were discussed in regards with the patient and the patient's wife who is actually in on a conference call..    2. Essential hypertension, benign  Discussed with patient blood pressure current along with recheck in insetting of age, family history and labile hypertension over 10 years suggested treatment.  Discussed treatment options, medications, dosing and side effects and recommend recheck blood pressure in 1 month.  - lisinopril (PRINIVIL/ZESTRIL) 20 MG tablet; Take 1 tablet (20 mg) by mouth daily  Dispense: 90 tablet; Refill: 1     BMI:   Estimated body mass index is 25.22 kg/m  as calculated from the following:    Height as of 4/17/19: 1.702 m (5' 7\").    Weight as of 4/17/19: 73 kg (161 lb).     See Patient Instructions    Return in about 1 month (around 8/22/2019) for BP Recheck.    Braulio Wei MD  [facility]      "

## 2019-07-25 NOTE — TELEPHONE ENCOUNTER
Call from Demetria Milan (RN for [facility]) stating that Dr. Disla has requested to see this PT and will make the determination from there what other appts are necessary.  Requested we leave everything scheduled as is for now.

## 2019-07-25 NOTE — TELEPHONE ENCOUNTER
Call received from Dr. Wei this morning about Mr. Haley.  Dr. Wei reviewed a CT Chest with the patient that shows a right renal mass with suspected mets to the lungs and mediastinal lymph nodes.  onc referral placed.    Reviewed with Dr. Disla who will see the patient on Monday 7/29/19 at 820 am in [place]. He would like Mr. Haley to have a CT- CAP with contrast prior to if able to arrange.  CT- CAP arranged at [facility] for Friday 7/26/19 at 730am.   Instructions reviewed with Jesus.   Appointment date, time and location reviewed with him for Monday's appointment in [place] with Dr. Disla

## 2019-07-25 NOTE — TELEPHONE ENCOUNTER
ONCOLOGY INTAKE: Records Information      APPT INFORMATION:  Referring provider:  Braulio Wei  Referring provider s clinic:  [facility]  Reason for visit/diagnosis:  Metastatic Renal Cell Carcinoma  Has patient been notified of appointment date and time?: Per Daquan (Fellow at [facility])    RECORDS INFORMATION:  Were the records received with the referral (via [employer])? No - Internal Referral    Has patient been seen for any external appt for this diagnosis? Per Daquan, no outside records    ADDITIONAL INFORMATION:  NA

## 2019-07-25 NOTE — TELEPHONE ENCOUNTER
ONCOLOGY INTAKE: Records Information      APPT INFORMATION:  Referring provider:  Dr. Wei  Referring provider s clinic: [facility]  Reason for visit/diagnosis: Mediastinal Lymphadenopathy  Has patient been notified of appointment date and time?:Message left to call and schedule    RECORDS INFORMATION:  Were the records received with the referral (via [employer])? No    Has patient been seen for any external appt for this diagnosis? ?    If yes, where? ?    Has patient had any imaging or procedures outside of [place] for this condition? ?      If Yes, where? ?    ADDITIONAL INFORMATION:

## 2019-07-25 NOTE — TELEPHONE ENCOUNTER
ONCOLOGY INTAKE: Records Information        APPT INFORMATION:  Referring provider:  Dr. Wei  Referring provider s clinic: [facility]  Reason for visit/diagnosis: Mediastinal Lymphadenopathy  Has patient been notified of appointment date and time?:Yes   RECORDS INFORMATION:  Were the records received with the referral (via [employer])? No     Has patient been seen for any external appt for this diagnosis? no     If yes, where? n/a   Has patient had any imaging or procedures outside of [place] for this condition? No                              If Yes, where? n/a     ADDITIONAL INFORMATION:None

## 2019-07-26 NOTE — TELEPHONE ENCOUNTER
Rx for albuterol sent on 7/22/2019 resent for Ventolin per insurance coverage.     Prescription approved per [facility] Refill Protocol.    Ida ZARATE RN, BSN, PHN

## 2019-07-27 NOTE — TELEPHONE ENCOUNTER
----- Message from Bernie Lewis RN sent at 7/26/2019  9:59 AM CDT -----  Regarding: move patient  Please contact patient and offer him the spot on hold for Dr Mosquera on 8/1 at 1:00pm.    Thank you!

## 2019-07-29 PROBLEM — N28.89 RIGHT KIDNEY MASS: Status: ACTIVE | Noted: 2019-01-01

## 2019-07-29 PROBLEM — R91.8 PULMONARY NODULES: Status: ACTIVE | Noted: 2019-01-01

## 2019-07-29 NOTE — LETTER
7/29/2019       RE: Jesus Haley  [address]  [place] [facility] [address]     Dear Colleague,    Thank you for referring your patient, Jesus Haley, to the [facility] [place] at [facility]. Please see a copy of my visit note below.    Urology Consult History and Physical  [facility]  Name: Jesus Haley    MRN: [id number]   YOB: 1960       We were asked to see Jesus Haley at the request of Dr. Disla for evaluation and treatment of RIGHT renal mass.        Chief Complaint:   RIGHT renal mass    History is obtained from the patient and his wife            History of Present Illness:   Jesus Haley is a 58 year old male who is being seen for evaluation of RIGHT renal mass.  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  He and his wife both developed viral upper respiratory illness which she has been dealing with for several weeks.  This prompted him to be seen by his primary care Dr. Wei and the patient requested a chest x-ray which demonstrated mediastinal adenopathy and lung nodules. CT of the chest was performed with the finding concerning for pulmonary nodules and a possible right kidney mass.  Further imaging was obtained with a CT abdomen pelvis with contrast which showed a 9 cm right renal mass and concern for pulmonary nodules and mediastinal adenopathy.  He was referred to oncology and seen by Dr. Disla this morning we then referred him to me for evaluation for a right nephrectomy.  He denies any history of right flank pain, hematuria, weight loss, fatigue.    ##RENAL MASS INITIAL ENCOUNTER##     Jesus Haley is a very pleasant 58 year old male who presents with a history of a RIGHT renal lesion/mass.  This was discovered incidentally.  Initially diagnosed about 4 day(s) ago.  The lesion is solid and enhancing.  The maximal  dimension of the renal lesion is 9 centimeters and located on the right side.      Concerning  his renal function, his last SCr is   Lab Results   Component Value Date    CR 0.76 07/29/2019   .  he has the following risk factors for development of chronic kidney disease None.       he does not have a history of previous abdominal or retroperitoneal surgery.  There is not a family history of renal cell cancer.  The patient Does not have  a history of smoking and currently is not smoking.    The mass/lesion is located in the Right side and is primarily located in the inter polar region.  The tumor is also 50 % endophytic and exophytic.  The mass/lesion primarily lies on the posterior surface.  With regard to the collecting system, the edge of the tumor arrives either abuts the collecting system or arrives within 4 mm of the collecting system.    ECOG Performance Score: 0 - Independent        (4 or >)  Location: Right kidney  Quality: concern for metastatic disease  Severity: Pulmonary and mediastinal nodules   Duration: 7/2019           Past Medical History:     Past Medical History:   Diagnosis Date     Unspecified asthma(493.90)     darwin cats other animals dander            Past Surgical History:     Past Surgical History:   Procedure Laterality Date     collar bone fracture Left 2001     SHOULDER SURGERY Left 1981    dislocated shoulder             Social History:     Social History     Tobacco Use     Smoking status: Never Smoker     Smokeless tobacco: Never Used   Substance Use Topics     Alcohol use: No       History   Smoking Status     Never Smoker   Smokeless Tobacco     Never Used            Family History:     Family History   Problem Relation Age of Onset     Diabetes Father         diet and alcohol related              Allergies:     Allergies   Allergen Reactions     No Known Allergies      Zyrtec [Cetirizine]      Difficulty breathing            Medications:     Current Outpatient Medications  "  Medication Sig     acetaminophen (TYLENOL) 500 MG tablet Take 500-1,000 mg by mouth every 6 hours as needed for mild pain     albuterol (VENTOLIN HFA) 108 (90 Base) MCG/ACT inhaler Inhale 2 puffs into the lungs every 6 hours as needed for shortness of breath / dyspnea or wheezing     fish oil-omega-3 fatty acids (OMEGA-3 FISH OIL) 1000 MG capsule Take 2 g by mouth 2 times daily     ibuprofen (ADVIL/MOTRIN) 100 MG tablet Take 100 mg by mouth every 4 hours as needed     No current facility-administered medications for this visit.              Review of Systems:     Skin: negative  Eyes: negative  Ears/Nose/Throat: negative  Respiratory: No shortness of breath, dyspnea on exertion, cough, or hemoptysis  Cardiovascular: negative  Gastrointestinal: negative  Genitourinary: as above  Musculoskeletal: negative  Neurologic: negative  Psychiatric: negative  Hematologic/Lymphatic/Immunologic: negative  Endocrine: negative          Physical Exam:     Patient Vitals for the past 24 hrs:   BP Pulse SpO2 Height Weight   07/29/19 0934 132/86 56 97 % 1.702 m (5' 7\") 71.2 kg (157 lb)     Body mass index is 24.59 kg/m .     General: age-appropriate appearing male in NAD  HEENT: Head AT/NC, EOMI, CN Grossly intact  Lungs: no respiratory distress, or pursed lip breathing  Heart: No obvious jugular venous distension present  Back: no bony midline tenderness, no CVAT bilaterally.  Abdomen: soft, non-distended, non-tender. No organomegaly  : No costovertebral tenderness bilaterally   Lymph: no palpable inguinal lymphadenopathy.  LE: no edema.   Musculoskeltal: extremities normal, no peripheral edema  Skin: no suspicious lesions or rashes  Neuro: Alert, oriented, speech and mentation normal;  moving all 4 extremities equally.  Psych: affect and mood normal          Data:   All laboratory data reviewed:    UA RESULTS:  Recent Labs   Lab Test 04/24/19  0837   COLOR Yellow   APPEARANCE Clear   URINEGLC Negative   URINEBILI Negative "   URINEKETONE Negative   SG 1.015   UBLD Negative   URINEPH 7.5*   PROTEIN Negative   UROBILINOGEN 0.2   NITRITE Negative   LEUKEST Negative      PSA   Date Value Ref Range Status   04/24/2019 0.27 0 - 4 ug/L Final     Comment:     Assay Method:  Chemiluminescence using Siemens Vista analyzer      Lab Results   Component Value Date    CR 0.81 04/24/2019        All pertinent imaging reviewed:    All imaging studies reviewed by me.  I personally reviewed these imaging films.    I reviewed the images with the patient and his wife.  A formal report from radiology will follow.    CT CHEST/ABD/PEL 7/26/19  FINDINGS:      Chest: Numerous bilateral lung metastases; the largest of which is a  pleural-based mass in the medial right lower lobe that measures up to  5.1 cm (series 6, image 233). There are also bulky metastatic lymph  nodes in the mireya and mediastinum. A subcarinal lymph node measures  3.8 x 2.8 cm (series 2, image 33). On the same image, there is a left  hilar lymph node that measures 3.1 x 2.8 cm. No pleural or pericardial  effusion. There is moderate-to-severe coronary atherosclerosis. There  is a subcentimeter low-density nodule in the right lobe of the thyroid  (series 2, image 5).     Abdomen and pelvis: There is a heterogeneous mass in the posterior  right kidney that measures 9.0 x 6.0 x 7.3 cm (series 2, image 72 and  series 4, image 88). There is evidence of neovascularization  recruitment around the periphery of this mass. There are a few central  calcifications within the mass. The right renal vein is patent.     The left kidney appears normal. The liver, gallbladder, spleen,  pancreas, and adrenal glands are unremarkable. No enlarged abdominal  or retroperitoneal lymph nodes. No abnormal bowel distention, free  air, or ascites. No pelvic adenopathy or mass.     No lytic or blastic bone lesions are demonstrated. There has been  prior repair of the left shoulder coronoid process.                                                                       IMPRESSION:  1. Large right renal mass measures up to 9.0 cm.  2. Numerous bilateral pulmonary metastases.  3. Lymph node metastases in bilateral pulmonary mireya and in the  mediastinum.  4. Moderate-to-severe coronary atherosclerosis.         Impression and Plan:   Impression:   58 year old man with a 9cm RIGHT renal mass concerning for renal cell carcinoma with concerning pulmonary and mediastinal nodules      Plan:   9cm RIGHT renal mass with likely pulmonary and mediastinal metastatic disease  - I reviewed the patient's imaging and labs with the patient and his wife.  We discussed that this does appear consistent with a metastatic right renal cell carcinoma with likely pulmonary mediastinal mets.  - We discussed the treatment at this point would be a nephrectomy followed by systemic therapy  - With regards to her nephrectomy we discussed the risks and benefits of surgery.  Specifically we discussed that this would be performed via a laparoscopic approach, would require 1 to 2 days in the hospital.  We discussed all of the risks and benefits of surgery and complications.  He is in excellent health otherwise.  - Orders for surgery placed and we are able to schedule this for Wednesday, July 31  - Patient will follow-up with me 2 weeks for postop check and then follow-up with Dr. Disla for further systemic therapy.     Thank you for the kind consultation.    Time spent: 40 minutes of which >50% was spent counseling.    Jas Berrios MD   Urology  [facility]  [facility] Phone: [phone]  [facility] Phone: [phone]         Again, thank you for allowing me to participate in the care of your patient.      Sincerely,    Jas Berrios MD

## 2019-07-29 NOTE — PATIENT INSTRUCTIONS
Labs today. Labs drawn. PJM.  CT guided biopsy of lung nodule. Delayed per Dr Disla, patient having surgery on 7/31/19  See Dr. Berrios in urology today. I have already talked to him and he will see the patient.  Follow up after biopsy.  Scheduled post op samuelt/dereje

## 2019-07-29 NOTE — PROGRESS NOTES
Visit Date:   07/29/2019      This consult has been requested by Dr. Wei for renal mass and pulmonary nodules.      Mr. Haley is a 58-year-old gentleman who has had allergies since the age of 9.  For last few months, he has been having allergic symptoms.  Lately, he had a worsening cough and chest congestion.  The patient was seen by Dr. Wei and had multiple investigations done:   1.  Chest x-ray on 07/22/2019 revealed bilateral lung nodules and mediastinal lymphadenopathy.   2.  CT chest without contrast on 07/25/2019 revealed a right renal mass and multiple lung metastases.   3.  CT of the chest, abdomen and pelvis with contrast on 07/26/2019 revealed a 9 cm right renal mass and bilateral pulmonary metastasis.  There is lymphadenopathy and bilateral pulmonary mireya in the mediastinum.  There is a subcentimeter low density in the right lobe of the thyroid.      The patient denies any abdominal pain.  Denies any back pain.  Denies any blood in the urine.  Overall, he has been doing good.      REVIEW OF SYSTEMS:  No headache.  No dizziness.  No visual symptoms.  No chest pain.  No shortness of breath except when he coughs a lot.  Occasional wheezing.  No abdominal pain or distention.  No urinary or bowel complaints.  No night sweats.  All other review of systems are negative.      ALLERGIES:  REVIEWED.      MEDICATIONS:  Reviewed.      PAST MEDICAL HISTORY:   1.  Allergies.   2.  Hypertension.   3.  Right shoulder surgery.   4.  Right leg surgery.      SOCIAL HISTORY:   -No tobacco use.   -He quit alcohol at the age of 21.      FAMILY HISTORY:    -His parents do not have any cancer.   -He has 2 sisters.  No history of cancer in them.      PHYSICAL EXAMINATION:   GENERAL:  Alert and oriented x 3.   VITAL SIGNS:  Reviewed.  ECOG PS of 0.     EYES:  No icterus.   THROAT:  No ulcer. No thrush.   NECK:  Supple. No lymphadenopathy. No thyromegaly.   AXILLAE:  No lymphadenopathy.   LUNGS:  Good air entry bilaterally.   No crackles or wheezing.   HEART:  Regular.  No murmur.   ABDOMEN:  Soft.  Nontender. No mass.   EXTREMITIES:  No pedal edema.  No calf swelling or tenderness.   SKIN:  No rash.      ASSESSMENT:   1.  A 58-year-old gentleman with a large right renal mass with bilateral lung nodules and mediastinal lymphadenopathy.  This is clinically consistent with metastatic renal cell carcinoma.   2.  Allergies.       RECOMMENDATIONS:   1.  CT-guided lung nodule biopsy.   2.  An appointment with Dr. Berrios today to discuss regarding nephrectomy.   3.  Labs today, including CBC, CMP and LDH.   4.  See me after the lung biopsy.      DISCUSSION:   1.  I had a long discussion with the patient and his wife.  I reviewed the CT scan.  The images were shown to them.  There is right renal mass along with lung nodules and chest lymphadenopathy.  Clinically, this is consistent with renal cell carcinoma.  It could be clear cell or non-clear cell carcinoma.     2.  Discussed regarding further workup.  I would like to get a CT-guided biopsy of the lung nodule to prove this is metastatic disease.  The patient is agreeable for it.  That will be scheduled.      3. Discussed regarding the right renal mass.  I would recommend that he have a nephrectomy done.  The rationale for this was discussed.  The patient is young and in good health.  I will refer him for high-dose IL-2 after nephrectomy.      We also discussed regarding the option of starting him on systemic treatment and doing the nephrectomy later on.  As I am contemplating high-dose IL-2, I would like him to get a nephrectomy.  The patient is agreeable for it.      I have discussed the case with Dr. Berrios in Urology.  He will see the patient today.      4.  We will get some baseline labs including CBC, CMP and LDH.      5.  The patient and wife had multiple questions, which were all answered.  I will see him back in the clinic after the lung nodule biopsy.      Thanks for the consult.      ADDENDUM: Patient saw Dr. Berrios today. He is going to have nephrectomy next week. Will get lung nodule biopsy after surgery.        JARRET LANDERS MD             D: 2019   T: 2019   MT: PAPITO      Name:     GLADYS DOWD   MRN:      [id number]        Account:      [id number]   :      1960           Visit Date:   2019      Document: [id number]

## 2019-07-29 NOTE — PROGRESS NOTES
Urology Consult History and Physical  [facility]  Name: Jesus Haley    MRN: [id number]   YOB: 1960       We were asked to see Jesus Haley at the request of Dr. Disla for evaluation and treatment of RIGHT renal mass.        Chief Complaint:   RIGHT renal mass    History is obtained from the patient and his wife            History of Present Illness:   Jesus Haley is a 58 year old male who is being seen for evaluation of RIGHT renal mass.  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  He and his wife both developed viral upper respiratory illness which she has been dealing with for several weeks.  This prompted him to be seen by his primary care Dr. Wei and the patient requested a chest x-ray which demonstrated mediastinal adenopathy and lung nodules. CT of the chest was performed with the finding concerning for pulmonary nodules and a possible right kidney mass.  Further imaging was obtained with a CT abdomen pelvis with contrast which showed a 9 cm right renal mass and concern for pulmonary nodules and mediastinal adenopathy.  He was referred to oncology and seen by Dr. Disla this morning we then referred him to me for evaluation for a right nephrectomy.  He denies any history of right flank pain, hematuria, weight loss, fatigue.    ##RENAL MASS INITIAL ENCOUNTER##     Jesus Haley is a very pleasant 58 year old male who presents with a history of a RIGHT renal lesion/mass.  This was discovered incidentally.  Initially diagnosed about 4 day(s) ago.  The lesion is solid and enhancing.  The maximal dimension of the renal lesion is 9 centimeters and located on the right side.      Concerning  his renal function, his last SCr is   Lab Results   Component Value Date    CR 0.76 07/29/2019   .  he has the following risk factors for development of chronic kidney disease None.       he does not have a history of previous  abdominal or retroperitoneal surgery.  There is not a family history of renal cell cancer.  The patient Does not have  a history of smoking and currently is not smoking.    The mass/lesion is located in the Right side and is primarily located in the inter polar region.  The tumor is also 50 % endophytic and exophytic.  The mass/lesion primarily lies on the posterior surface.  With regard to the collecting system, the edge of the tumor arrives either abuts the collecting system or arrives within 4 mm of the collecting system.    ECOG Performance Score: 0 - Independent        (4 or >)  Location: Right kidney  Quality: concern for metastatic disease  Severity: Pulmonary and mediastinal nodules   Duration: 7/2019           Past Medical History:     Past Medical History:   Diagnosis Date     Unspecified asthma(493.90)     darwin cats other animals dander            Past Surgical History:     Past Surgical History:   Procedure Laterality Date     collar bone fracture Left 2001     SHOULDER SURGERY Left 1981    dislocated shoulder             Social History:     Social History     Tobacco Use     Smoking status: Never Smoker     Smokeless tobacco: Never Used   Substance Use Topics     Alcohol use: No       History   Smoking Status     Never Smoker   Smokeless Tobacco     Never Used            Family History:     Family History   Problem Relation Age of Onset     Diabetes Father         diet and alcohol related              Allergies:     Allergies   Allergen Reactions     No Known Allergies      Zyrtec [Cetirizine]      Difficulty breathing            Medications:     Current Outpatient Medications   Medication Sig     acetaminophen (TYLENOL) 500 MG tablet Take 500-1,000 mg by mouth every 6 hours as needed for mild pain     albuterol (VENTOLIN HFA) 108 (90 Base) MCG/ACT inhaler Inhale 2 puffs into the lungs every 6 hours as needed for shortness of breath / dyspnea or wheezing     fish oil-omega-3 fatty acids (OMEGA-3 FISH  "OIL) 1000 MG capsule Take 2 g by mouth 2 times daily     ibuprofen (ADVIL/MOTRIN) 100 MG tablet Take 100 mg by mouth every 4 hours as needed     No current facility-administered medications for this visit.              Review of Systems:     Skin: negative  Eyes: negative  Ears/Nose/Throat: negative  Respiratory: No shortness of breath, dyspnea on exertion, cough, or hemoptysis  Cardiovascular: negative  Gastrointestinal: negative  Genitourinary: as above  Musculoskeletal: negative  Neurologic: negative  Psychiatric: negative  Hematologic/Lymphatic/Immunologic: negative  Endocrine: negative          Physical Exam:     Patient Vitals for the past 24 hrs:   BP Pulse SpO2 Height Weight   07/29/19 0934 132/86 56 97 % 1.702 m (5' 7\") 71.2 kg (157 lb)     Body mass index is 24.59 kg/m .     General: age-appropriate appearing male in NAD  HEENT: Head AT/NC, EOMI, CN Grossly intact  Lungs: no respiratory distress, or pursed lip breathing  Heart: No obvious jugular venous distension present  Back: no bony midline tenderness, no CVAT bilaterally.  Abdomen: soft, non-distended, non-tender. No organomegaly  : No costovertebral tenderness bilaterally   Lymph: no palpable inguinal lymphadenopathy.  LE: no edema.   Musculoskeltal: extremities normal, no peripheral edema  Skin: no suspicious lesions or rashes  Neuro: Alert, oriented, speech and mentation normal;  moving all 4 extremities equally.  Psych: affect and mood normal          Data:   All laboratory data reviewed:    UA RESULTS:  Recent Labs   Lab Test 04/24/19  0837   COLOR Yellow   APPEARANCE Clear   URINEGLC Negative   URINEBILI Negative   URINEKETONE Negative   SG 1.015   UBLD Negative   URINEPH 7.5*   PROTEIN Negative   UROBILINOGEN 0.2   NITRITE Negative   LEUKEST Negative      PSA   Date Value Ref Range Status   04/24/2019 0.27 0 - 4 ug/L Final     Comment:     Assay Method:  Chemiluminescence using Siemens Vista analyzer      Lab Results   Component Value Date    " CR 0.81 04/24/2019        All pertinent imaging reviewed:    All imaging studies reviewed by me.  I personally reviewed these imaging films.    I reviewed the images with the patient and his wife.  A formal report from radiology will follow.    CT CHEST/ABD/PEL 7/26/19  FINDINGS:      Chest: Numerous bilateral lung metastases; the largest of which is a  pleural-based mass in the medial right lower lobe that measures up to  5.1 cm (series 6, image 233). There are also bulky metastatic lymph  nodes in the mireya and mediastinum. A subcarinal lymph node measures  3.8 x 2.8 cm (series 2, image 33). On the same image, there is a left  hilar lymph node that measures 3.1 x 2.8 cm. No pleural or pericardial  effusion. There is moderate-to-severe coronary atherosclerosis. There  is a subcentimeter low-density nodule in the right lobe of the thyroid  (series 2, image 5).     Abdomen and pelvis: There is a heterogeneous mass in the posterior  right kidney that measures 9.0 x 6.0 x 7.3 cm (series 2, image 72 and  series 4, image 88). There is evidence of neovascularization  recruitment around the periphery of this mass. There are a few central  calcifications within the mass. The right renal vein is patent.     The left kidney appears normal. The liver, gallbladder, spleen,  pancreas, and adrenal glands are unremarkable. No enlarged abdominal  or retroperitoneal lymph nodes. No abnormal bowel distention, free  air, or ascites. No pelvic adenopathy or mass.     No lytic or blastic bone lesions are demonstrated. There has been  prior repair of the left shoulder coronoid process.                                                                      IMPRESSION:  1. Large right renal mass measures up to 9.0 cm.  2. Numerous bilateral pulmonary metastases.  3. Lymph node metastases in bilateral pulmonary mireya and in the  mediastinum.  4. Moderate-to-severe coronary atherosclerosis.         Impression and Plan:   Impression:   58 year  old man with a 9cm RIGHT renal mass concerning for renal cell carcinoma with concerning pulmonary and mediastinal nodules      Plan:   9cm RIGHT renal mass with likely pulmonary and mediastinal metastatic disease  - I reviewed the patient's imaging and labs with the patient and his wife.  We discussed that this does appear consistent with a metastatic right renal cell carcinoma with likely pulmonary mediastinal mets.  - We discussed the treatment at this point would be a nephrectomy followed by systemic therapy  - With regards to her nephrectomy we discussed the risks and benefits of surgery.  Specifically we discussed that this would be performed via a laparoscopic approach, would require 1 to 2 days in the hospital.  We discussed all of the risks and benefits of surgery and complications.  He is in excellent health otherwise.  - Orders for surgery placed and we are able to schedule this for Wednesday, July 31  - Patient will follow-up with me 2 weeks for postop check and then follow-up with Dr. Disla for further systemic therapy.     Thank you for the kind consultation.    Time spent: 40 minutes of which >50% was spent counseling.    Jas Berrios MD   Urology  [facility]  [facility] Phone: [phone]  [facility] Phone: [phone]

## 2019-07-29 NOTE — NURSING NOTE
Chief Complaint   Patient presents with     Clinic Care Coordination - Follow-up     Pt here for kidney mass     Rox Rhodes

## 2019-07-29 NOTE — PROGRESS NOTES
"Oncology Rooming Note    July 29, 2019 8:22 AM   Jesus Haley is a 58 year old male who presents for:    Chief Complaint   Patient presents with     Oncology Clinic Visit     New Patient     Initial Vitals: /85   Pulse 54   Ht 1.702 m (5' 7\")   Wt 73.5 kg (162 lb)   SpO2 98%   BMI 25.37 kg/m   Estimated body mass index is 25.37 kg/m  as calculated from the following:    Height as of this encounter: 1.702 m (5' 7\").    Weight as of this encounter: 73.5 kg (162 lb). Body surface area is 1.86 meters squared.  No Pain (0) Comment: Data Unavailable   No LMP for male patient.  Allergies reviewed: Yes  Medications reviewed: Yes    Medications: Medication refills not needed today.  Pharmacy name entered into [facility]: [facility] [id number] [place] [facility] [address]    Clinical concerns: no      Lila Simms CMA            "

## 2019-07-29 NOTE — Clinical Note
Hi Dr. Wei and Dr. Disla,I saw Jesus and his wife in consultation for his right renal mass concerning for metastatic renal cell carcinoma.  I saw him this morning following his appointment with Dr. Disla.  We discussed that given his likely metastatic disease this would require multimodal treatment approach and that a standard of care would be to perform nephrectomy at the earliest possible time.  I was able to get him scheduled for a right laparoscopic nephrectomy on July 31.  I will keep you both updated with the results from the nephrectomy.Please let me know if you have any questions or concerns.Thanks, Jas Berrios M.D.Cell: [phone]

## 2019-07-29 NOTE — Clinical Note
"    7/29/2019         RE: Jesus Haley  [address]  [place] [address]        Dear Colleague,    Thank you for referring your patient, Jesus Haley, to the [facility]. Please see a copy of my visit note below.    Oncology Rooming Note    July 29, 2019 8:22 AM   Jesus Haley is a 58 year old male who presents for:    Chief Complaint   Patient presents with     Oncology Clinic Visit     New Patient     Initial Vitals: /85   Pulse 54   Ht 1.702 m (5' 7\")   Wt 73.5 kg (162 lb)   SpO2 98%   BMI 25.37 kg/m    Estimated body mass index is 25.37 kg/m  as calculated from the following:    Height as of this encounter: 1.702 m (5' 7\").    Weight as of this encounter: 73.5 kg (162 lb). Body surface area is 1.86 meters squared.  No Pain (0) Comment: Data Unavailable   No LMP for male patient.  Allergies reviewed: Yes  Medications reviewed: Yes    Medications: Medication refills not needed today.  Pharmacy name entered into [facility]: [facility] [id number] [place] [facility], [place] - [address]    Clinical concerns: no      Lila Simms, [facility]              Visit Date:   07/29/2019      This consult has been requested by Dr. Wei for renal mass and pulmonary nodules.      Mr. Haley is a 58-year-old gentleman who has had allergies since the age of 9.  For last few months, he has been having allergic symptoms.  Lately, he had a worsening cough and chest congestion.  The patient was seen by Dr. Wei and had multiple investigations done:   1.  Chest x-ray on 07/22/2019 revealed bilateral lung nodules and mediastinal lymphadenopathy.   2.  CT chest without contrast on 07/25/2019 revealed a right renal mass and multiple lung metastases.   3.  CT of the chest, abdomen and pelvis with contrast on 07/26/2019 revealed a 9 cm right renal mass and bilateral pulmonary metastasis.  There is lymphadenopathy and bilateral pulmonary mireya in the mediastinum.  There is a subcentimeter low density " in the right lobe of the thyroid.      The patient denies any abdominal pain.  Denies any back pain.  Denies any blood in the urine.  Overall, he has been doing good.      REVIEW OF SYSTEMS:  No headache.  No dizziness.  No visual symptoms.  No chest pain.  No shortness of breath except when he coughs a lot.  Occasional wheezing.  No abdominal pain or distention.  No urinary or bowel complaints.  No night sweats.  All other review of systems are negative.      ALLERGIES:  REVIEWED.      MEDICATIONS:  Reviewed.      PAST MEDICAL HISTORY:   1.  Allergies.   2.  Hypertension.   3.  Right shoulder surgery.   4.  Right leg surgery.      SOCIAL HISTORY:   -No tobacco use.   -He quit alcohol at the age of 21.      FAMILY HISTORY:    -His parents do not have any cancer.   -He has 2 sisters.  No history of cancer in them.      PHYSICAL EXAMINATION:   GENERAL:  Alert and oriented x 3.   VITAL SIGNS:  Reviewed.  ECOG PS of 0.     EYES:  No icterus.   THROAT:  No ulcer. No thrush.   NECK:  Supple. No lymphadenopathy. No thyromegaly.   AXILLAE:  No lymphadenopathy.   LUNGS:  Good air entry bilaterally.  No crackles or wheezing.   HEART:  Regular.  No murmur.   ABDOMEN:  Soft.  Nontender. No mass.   EXTREMITIES:  No pedal edema.  No calf swelling or tenderness.   SKIN:  No rash.      ASSESSMENT:   1.  A 58-year-old gentleman with a large right renal mass with bilateral lung nodules and mediastinal lymphadenopathy.  This is clinically consistent with metastatic renal cell carcinoma.   2.  Allergies.       RECOMMENDATIONS:   1.  CT-guided lung nodule biopsy.   2.  An appointment with Dr. Berrios today to discuss regarding nephrectomy.   3.  Labs today, including CBC, CMP and LDH.   4.  See me after the lung biopsy.      DISCUSSION:   1.  I had a long discussion with the patient and his wife.  I reviewed the CT scan.  The images were shown to them.  There is right renal mass along with lung nodules and chest lymphadenopathy.   Clinically, this is consistent with renal cell carcinoma.  It could be clear cell or non-clear cell carcinoma.     2.  Discussed regarding further workup.  I would like to get a CT-guided biopsy of the lung nodule to prove this is metastatic disease.  The patient is agreeable for it.  That will be scheduled.      3. Discussed regarding the right renal mass.  I would recommend that he have a nephrectomy done.  The rationale for this was discussed.  The patient is young and in good health.  I will refer him for high-dose IL-2 after nephrectomy.      We also discussed regarding the option of starting him on systemic treatment and doing the nephrectomy later on.  As I am contemplating high-dose IL-2, I would like him to get a nephrectomy.  The patient is agreeable for it.      I have discussed the case with Dr. Berrios in Urology.  He will see the patient today.      4.  We will get some baseline labs including CBC, CMP and LDH.      5.  The patient and wife had multiple questions, which were all answered.  I will see him back in the clinic after the lung nodule biopsy.      Thanks for the consult.     ADDENDUM: Patient saw Dr. Berrios today. He is going to have nephrectomy next week. Will get lung nodule biopsy after surgery.        JARRET LANDERS MD             D: 2019   T: 2019   MT: PAPITO      Name:     GLADYS DOWD   MRN:      [id number]        Account:      [id number]   :      1960           Visit Date:   2019      Document: [id number]        Again, thank you for allowing me to participate in the care of your patient.        Sincerely,        Jarret Landers MD

## 2019-07-30 NOTE — RESULT ENCOUNTER NOTE
Let him know that blood tests are overall good.  There is mild anemia.  Will monitor it.    Chaitanya Disla

## 2019-07-31 PROBLEM — C64.1: Status: ACTIVE | Noted: 2019-01-01

## 2019-07-31 NOTE — TELEPHONE ENCOUNTER
ONCOLOGY INTAKE: Records Information      APPT INFORMATION: 8/6/19 - RANJANA Zheng CSC  Referring provider:  Braulio Wei  Referring provider s clinic:  [facility]  Reason for visit/diagnosis:  new metastatic renal cell carcinoma  Has patient been notified of appointment date and time?: Yes    RECORDS INFORMATION:  Were the records received with the referral (via [facility]): Internal referral    Has patient been seen for any external appt for this diagnosis? No    If yes, where? NA    Has patient had any imaging or procedures outside of [facility] for this condition? No      If Yes, where? NA    ADDITIONAL INFORMATION:  Scheduled via inbasket from [facility]/Dr Conway  Confirmed with pt via phone

## 2019-07-31 NOTE — PLAN OF CARE
A&Ox4. VSS exp BP slightly elevated. C/o 6/10 LLQ abd pain, given scheduled tylenol and oxycodone x1 prior to ambulation. Surgical sites WDL. Clear liq diet, tolerated well. Voiding adequately per cagle. PIV infu NS @100 mL/hr. Plan to ambulate tonight.

## 2019-07-31 NOTE — PLAN OF CARE
Pt arrived to floor approx 1230. A&Ox4. VSS exp BP slightly elevated. C/o 9/10 LLQ abd pain, given dilaudid x1. Surgical sites WDL. Clear liq diet, tolerated well. Voiding adequately per cagle. PIV infu NS @100 mL/hr. Has not yet ambulated.

## 2019-07-31 NOTE — DISCHARGE INSTRUCTIONS
POSTOPERATIVE INSTRUCTIONS    Diagnosis-------------------------------   RIGHT renal neoplasm     Procedure-------------------------------  Procedure(s) (LRB):  RIGHT LAPAROSCOPIC RADICAL NEPHRECTOMY (Right)      Findings--------------------------------  RIGHT radial nephrectomy performed without complications.    Home-going instructions-----------------         Activity Limitation:     - No driving or operating heavy machinery while on narcotic pain medication.   - No strenuous exercise for 6 weeks.   - No lifting, pushing, pulling more than 10 pounds for 6 weeks. Take care when pushing with your arms to stand up.   - Do not strain your belly area. When you bend, sit up or twice, you could strain the area around your incision.   - Do not strain with bowel movements.   - Do not drive until you can press the brake pedal quickly and fully without pain.   - Do not operate a motor vehicle while taking narcotic pain medications    FOLLOW THESE INSTRUCTIONS AS INDICATED BELOW:  - Observe operative area for signs of excessive bleeding.  - You may shower.  - Increase fluid intake to promote clear urine.  - Resume usual diet as tolerated    What to expect while recovering----------- WOUND CARE:  - You may shower and get incisions wet starting 48 hrs after surgery.  - Do not scrub incisions or submerge wounds (aka, bath, pool, hot tub, etc.) for 2 weeks or until wounds have healed  - Remove wound dressing 48 hours after surgery if already not removed.   - If purple dermabond glue was used, avoid applying any lotions or ointments.   - Leave incision open to air. Cover with gauze only if needed for comfort or to protect clothing from drainage.    Discharge Medications/instructions:   1) PAIN: Oxycodone is a narcotic medication that has been prescribed for pain. Narcotics will cause sleepiness and constipation, therefore it is best to stop or reduce them as soon as you can and switch to using acetaminophen (Tylenol) and/or  "ibuprofen (Advil/Motrin), taken as directed on the packaging. Keep in mind that certain narcotics can contain acetaminophen, also called \"APAP\" on prescription bottles. Do not take more than 4,000mg of Tylenol (acetaminophen/ APAP) from all sources in any 24 hour period since this can cause liver damage. Never drive, operate machinery or drink alcoholic beverages while you are taking narcotic pain medications.     2) CONSTIPATION: Pericolace (senna/docusate sodium) can be taken twice daily for prevention of constipation since surgery, pain medications and bladder spasm medications can all make you constipated. Please reduce or stop pericolace if you develop loose stools. Other over the counter solutions such as prune juice, miralax, fiber products, senna, and dulcolax can also be used. If you are taking the pericolace but still have not had a bowel movement in 3 days, start over-the-counter Milk of Magnesia taken twice daily until you have a nice bowel movement. Call the office with any concerns.       Questions/concerns------------------------  [facility]: [phone]    Future appointments  You are scheduled for follow up with Dr. Berrios on 8/14/19.    Jas Berrios MD      "

## 2019-07-31 NOTE — OP NOTE
OPERATIVE REPORT  DATE OF SURGERY: 07/31/19  LOCATION OF SURGERY: [facility] OR  PREOPERATIVE DIAGNOSIS:  (C64.1) Renal malignant neoplasm, right (H)  (primary encounter diagnosis)  POSTOPERATIVE DIAGNOSIS: (C64.1) Renal malignant neoplasm, right (H)  (primary encounter diagnosis)    START TIME: 8:16 AM  END TIME: 10:21 AM  PROCEDURE PERFORMED:   LAPAROSCOPIC RADICAL NEPHRECTOMY - RIGHT     STAFF SURGEON: Jas Berrios MD  ASSISTANT: Brenda Benitez PA-C  ANESTHESIA: General.   ESTIMATED BLOOD LOSS: 10 mL.   DRAINS AND TUBES: 16fr cagle catheter  COMPLICATIONS: None.   DISPOSITION: PACU.   SPECIMENS OBTAINED:   ID Type Source Tests Collected by Time Destination   A : Right kidney Tissue Kidney, Right SURGICAL PATHOLOGY EXAM Jas Berrios MD 7/31/2019  9:59 AM      SIGNIFICANT FINDINGS: RIGHT radical nephrectomy performed without complications.     HISTORY OF PRESENT ILLNESS: Jesus Haley is a 58 year old male who is being seen for evaluation of RIGHT renal mass.  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  He and his wife both developed viral upper respiratory illness which she has been dealing with for several weeks.  This prompted him to be seen by his primary care Dr. Wei and the patient requested a chest x-ray which demonstrated mediastinal adenopathy and lung nodules. CT of the chest was performed with the finding concerning for pulmonary nodules and a possible right kidney mass.  Further imaging was obtained with a CT abdomen pelvis with contrast which showed a 9 cm right renal mass and concern for pulmonary nodules and mediastinal adenopathy.  He was referred to oncology and seen by Dr. Disla we then referred to me for evaluation for a right nephrectomy. We discussed discussed the treatment options, the risks and benefits of surgery and he elected to proceed with the above surgery.     OPERATION PERFORMED:   Informed consent was  obtained and the patient was brought to the operating room where general anesthesia was induced. The patient was given appropriate preoperative antibiotics and positioned supine. He was then repositioned in left lateral decubitus with the RIGHT side up. We then performed a timeout, verifying the correct patient's site and procedure to be performed.    Access was gained to the abdomen for CO2 insufflation with a Veress needle of the lateral border of the rectus muscle in line with the tip of the 11th rib in the camera port site on the RIGHT. After access was obtained, the 12-mm port was inserted and the camera was inserted. No adhesions were noted. Another 12 mm port was placed in the usual spot for the inferior hand in line with our planned Costa extraction site as well as an 5-mm port medial to the RIGHT costal margin. An additional 5-mm port was placed for a liver retractor which was established with a locking clamp. Atraumatic bowel graspers and the hot laparoscopic scissors were used to find the plane was found between the mesentery and Gerota's fascia. The omentum and mesentary was carefully release from the retroperitoneum. Dissection was carried out with the use of sharp dissection. The duodenum was partially kocherized. The IVC was identified and the RIGHT renal vein and gonadal vein were identified. The gonadal vein was kept medial and the lower pole of the kidney and ureter were elevated off the psoas muscle.  Dissection was carried to the hilum and a window above the RIGHT renal artery was exposed. The renal artery was controlled with Weck clips, with 3 clips on the stay side, and divided sharply. The renal vein was controlled with a single load of the 45 mm vascular stapler. Once this was done the superior and lateral attachments of the kidney was released with hook and scissor cautery. The ureter was transected with hem-o-lock clips. The entire kidney was mobilized and freed. The kidney was placed in a  endo-catch bag. A 0 Vicryl tie was placed at the 12 mm camera port site with a Dheeraj-Ureña. A Costa incision was made in a muscle sparing fashion and the specimen was extracted. The muscle was reapproximated with 0 Vicryl and the external fascia was closed with 0 PDS. At that point the patient's abdomen was then re-insuflated and the camera was replaced to re-assess the renal fossa and interior edge of our fascial closure. The closure was intact and free of bowel or mesentary. The renal fossa was dry. The port sites were closed in a running fashion with 4-0 Monocylr suture. The Costa closed with 4-0 Monocryl.   The patient was awoken from anesthesia and taken to PACU in stable condition.  I was scrubbed and performed the entire procedure with assistance of Brenda Benitez PA-C who was instrumental to the procedure and assisted with port placement, manipulation of the laparoscopic camera and wound closure. No other qualified surgical assist or resident was available for the case.    Jas Berrios MD   Urology  [facility]  Clinic Phone [phone]

## 2019-07-31 NOTE — ANESTHESIA POSTPROCEDURE EVALUATION
Patient: Jesus Haley    Procedure(s):  RIGHT LAPAROSCOPIC RADICAL NEPHRECTOMY    Diagnosis:RIGHT RENAL MASS  Diagnosis Additional Information: No value filed.    Anesthesia Type:  General, ETT    Note:  Anesthesia Post Evaluation    Patient location during evaluation: PACU  Patient participation: Able to fully participate in evaluation  Level of consciousness: awake  Pain management: adequate  Airway patency: patent  Cardiovascular status: acceptable  Respiratory status: acceptable  Hydration status: acceptable  PONV: none     Anesthetic complications: None          Last vitals:  Vitals:    07/31/19 1050 07/31/19 1110 07/31/19 1120   BP: (!) 142/80 128/79 127/73   Pulse: 56 54 53   Resp: 16 12 10   Temp: 37.4  C (99.3  F) 37.2  C (99  F) 37.1  C (98.8  F)   SpO2: 100% 95% 94%         Electronically Signed By: CHARLES WOOD MD  July 31, 2019  11:29 AM

## 2019-07-31 NOTE — PROGRESS NOTES
Admission medication history interview status for the 7/31/2019  admission is complete. See EPIC admission navigator for prior to admission medications     Medication history source reliability:Good    Medication history interview source(s):Patient    Medication history resources (including written lists, pill bottles, clinic record):None    Primary pharmacy.[facility]    Additional medication history information not noted on PTA med list :None    Time spent in this activity: 30 minutes    Prior to Admission medications    Medication Sig Last Dose Taking? Auth Provider   acetaminophen (TYLENOL) 500 MG tablet Take 500-1,000 mg by mouth every 6 hours as needed for mild pain 7/30/2019 at pm Yes Reported, Patient   albuterol (VENTOLIN HFA) 108 (90 Base) MCG/ACT inhaler Inhale 2 puffs into the lungs every 6 hours as needed for shortness of breath / dyspnea or wheezing 7/29/2019 at prn Yes Braulio Wei MD   fish oil-omega-3 fatty acids (OMEGA-3 FISH OIL) 1000 MG capsule Take 1 g by mouth 2 times daily  7/30/2019 at am Yes Reported, Patient   ibuprofen (ADVIL/MOTRIN) 200 MG tablet Take 200-400 mg by mouth 2 times daily as needed for mild pain 7/29/2019 at 1200 Yes Reported, Patient

## 2019-07-31 NOTE — ANESTHESIA CARE TRANSFER NOTE
Patient: Jesus Haley    Procedure(s):  RIGHT LAPAROSCOPIC RADICAL NEPHRECTOMY    Diagnosis: RIGHT RENAL MASS  Diagnosis Additional Information: No value filed.    Anesthesia Type:   General, ETT     Note:  Airway :Face Mask  Patient transferred to:PACU  Comments: Pt to PACU with O2 via mask, airway patent, VSS.  Report to RN.Handoff Report: Identifed the Patient, Identified the Reponsible Provider, Reviewed the pertinent medical history, Discussed the surgical course, Reviewed Intra-OP anesthesia mangement and issues during anesthesia, Set expectations for post-procedure period and Allowed opportunity for questions and acknowledgement of understanding      Vitals: (Last set prior to Anesthesia Care Transfer)    CRNA VITALS  7/31/2019 1005 - 7/31/2019 1044      7/31/2019             Pulse:  75    SpO2:  99 %    Resp Rate (set):  10                Electronically Signed By: GRACIA Martínez CRNA  July 31, 2019  10:44 AM

## 2019-07-31 NOTE — ANESTHESIA PREPROCEDURE EVALUATION
Anesthesia Pre-Procedure Evaluation    Patient: Jesus Haley   MRN: [id number] : 1960          Preoperative Diagnosis: RIGHT RENAL MASS    Procedure(s):  RIGHT LAPAROSCOPIC RADICAL NEPHRECTOMY    Past Medical History:   Diagnosis Date     Unspecified asthma(493.90)     darwin cats other animals dander     Past Surgical History:   Procedure Laterality Date     collar bone fracture Left      SHOULDER SURGERY Left     dislocated shoulder      Allergies   Allergen Reactions     No Known Allergies      Zyrtec [Cetirizine]      Difficulty breathing     Social History     Tobacco Use     Smoking status: Never Smoker     Smokeless tobacco: Never Used   Substance Use Topics     Alcohol use: No     Prior to Admission medications    Medication Sig Start Date End Date Taking? Authorizing Provider   acetaminophen (TYLENOL) 500 MG tablet Take 500-1,000 mg by mouth every 6 hours as needed for mild pain   Yes Reported, Patient   albuterol (VENTOLIN HFA) 108 (90 Base) MCG/ACT inhaler Inhale 2 puffs into the lungs every 6 hours as needed for shortness of breath / dyspnea or wheezing 19  Yes Braulio Wei MD   fish oil-omega-3 fatty acids (OMEGA-3 FISH OIL) 1000 MG capsule Take 1 g by mouth 2 times daily    Yes Reported, Patient   ibuprofen (ADVIL/MOTRIN) 200 MG tablet Take 200-400 mg by mouth 2 times daily as needed for mild pain   Yes Reported, Patient     Current Facility-Administered Medications Ordered in Epic   Medication Dose Route Frequency Last Rate Last Dose     ceFAZolin (ANCEF) 1 g vial to attach to  ml bag for ADULT or 50 ml bag for PEDS  1 g Intravenous See Admin Instructions         ceFAZolin (ANCEF) intermittent infusion 2 g in 100 mL dextrose PRE-MIX  2 g Intravenous Pre-Op/Pre-procedure x 1 dose         No current [facility]-ordered outpatient medications on file.       Recent Labs   Lab Test 19  0926 19  0838    137   POTASSIUM 4.0 4.1   CHLORIDE 105 103   CO2 30 31   ANIONGAP  5 3   * 93   BUN 17 19   CR 0.76 0.81   ANITA 9.4 9.2     Recent Labs   Lab Test 07/29/19  0926 04/24/19  0838   WBC 6.0 5.4   HGB 12.3* 13.2*    315     No results for input(s): ABO, RH in the last 71509 hours.  No results for input(s): TROPI in the last 12664 hours.  No results for input(s): PH, PCO2, PO2, HCO3 in the last 32214 hours.  No results for input(s): HCG in the last 00392 hours.  Recent Results (from the past 744 hour(s))   XR Chest 2 Views   Result Value    Radiologist flags See impression (Urgent)    Narrative    CHEST TWO VIEWS 7/22/2019 8:16 AM     HISTORY: Viral upper respiratory tract infection.    COMPARISON: None.     FINDINGS: Numerous bilateral pulmonary nodules noted measuring up to  1.8 cm on the left and 2.0 cm on the right. Paramidline mass measuring  4.1 cm. CT scan with contrast recommended for further evaluation.  Probable left hilar and mediastinal adenopathy. There are no acute  infiltrates. The cardiac silhouette is not enlarged. Pulmonary  vasculature is unremarkable.      Impression    IMPRESSION: Probable mediastinal adenopathy and multiple bilateral  lung nodules concerning for malignancy.    [Access Center: See impression]    This report will be copied to the [place] [facility] to ensure a  provider acknowledges the finding. [facility] Center is available Monday  through Friday 8am-3:30 pm.     CARTER WILLIS MD   CT Chest w/o Contrast   Result Value    Radiologist flags Right renal mass with metastatic disease in the (Urgent)    Narrative    CT CHEST WITHOUT CONTRAST 7/25/2019 7:48 AM     HISTORY: Acute respiratory illness, greater than 40 years old.  Mediastinal lymphadenopathy.    COMPARISON: Chest x-ray 7/22/2019.    TECHNIQUE: Axial images from the thoracic inlet to the lung bases are  performed with additional coronal reformatted images. No contrast is  utilized.  Radiation dose for this scan was reduced using automated  exposure control, adjustment of the mA  and/or kV according to patient  size, or iterative reconstruction technique.    FINDINGS:     Chest: Tiny 0.3 cm right upper lobe lung nodule is noted on series 6,  image 86. Calcified granulomas are noted in the medial right lower  lobe on image 138. Indeterminate 0.4 cm nodule anterior right upper  lobe is present on image 158. Multiple large noncalcified masses are  noted at the lung bases. Dominant nodule along the pleural surface of  the medial right lower lobe as seen on recent chest x-ray measures 5.1  x 2.0 cm. Another abutting the major fissure measures up to 2.6 cm on  image 213. Numerous additional nodules measure well over 1 cm at the  lung bases. Several additional upper lobe nodules are present  measuring approximately 1 cm each in size.    No pleural or pericardial fluid. Heart size is within normal limits.  Calcified right hilar lymph nodes are consistent with old  granulomatous disease. Enlarged left hilar lymph node measures 2.8 cm  without associated calcification concerning for metastatic adenopathy.  Large right cardiophrenic mass could potentially reflect a  pleural-based right middle lobe mass versus cardiophrenic lymph node  measuring 4.2 x 3.6 cm on series 2, image 43. Subcarinal lymph node on  series 2, image 33 measures 3.1 x 2.4 cm. No enlarged axillary lymph  nodes.    Thyroid gland appears normal in size where imaged. Artifact through  the region of the thoracic inlet is due to a malleable plate and  screws reducing an old left clavicular fracture. Limited images upper  abdomen demonstrate a partially imaged complex mass with associated  calcification in the right kidney measuring at least 6.3 cm in size.  Findings are concerning for renal cell carcinoma with pulmonary and  probable lymph node metastases. Calcified granulomas are present in  the spleen. Liver is grossly unremarkable allowing for the noncontrast  technique. Bone window examination shows no aggressive-appearing  bone  lesions.      Impression    IMPRESSION:  1. Large partially imaged right renal mass with associated  calcification concerning for renal cell carcinoma. Suspected  metastatic disease to the lungs and mediastinal lymph nodes. No  obvious liver metastases on these noncontrast images. Follow-up PET/CT  imaging recommended for staging.  2. Evidence of old granulomatous disease.    [Access Center: Right renal mass with metastatic disease in the  chest.]    This report will be copied to the [facility] to ensure a  provider acknowledges the finding. [facility] is available Monday  through Friday 8am-3:30 pm.     SUMI ZELAYA MD   CT Chest/Abdomen/Pelvis w Contrast    Narrative    CT CHEST/ABDOMEN/PELVIS WITH CONTRAST   7/26/2019 7:36 AM     HISTORY: Please compare to CT done 7/25/2019 scan. Renal mass with  suspected mets to lung. Renal mass.    TECHNIQUE:  82 mL Isovue-370. CT images of the chest, abdomen, and  pelvis after nonionic intravenous contrast. Radiation dose for this  scan was reduced using automated exposure control, adjustment of the  mA and/or kV according to patient size, or iterative reconstruction  technique.    COMPARISON: 7/25/2019    FINDINGS:     Chest: Numerous bilateral lung metastases; the largest of which is a  pleural-based mass in the medial right lower lobe that measures up to  5.1 cm (series 6, image 233). There are also bulky metastatic lymph  nodes in the mireya and mediastinum. A subcarinal lymph node measures  3.8 x 2.8 cm (series 2, image 33). On the same image, there is a left  hilar lymph node that measures 3.1 x 2.8 cm. No pleural or pericardial  effusion. There is moderate-to-severe coronary atherosclerosis. There  is a subcentimeter low-density nodule in the right lobe of the thyroid  (series 2, image 5).    Abdomen and pelvis: There is a heterogeneous mass in the posterior  right kidney that measures 9.0 x 6.0 x 7.3 cm (series 2, image 72 and  series 4, image 88).  There is evidence of neovascularization  recruitment around the periphery of this mass. There are a few central  calcifications within the mass. The right renal vein is patent.    The left kidney appears normal. The liver, gallbladder, spleen,  pancreas, and adrenal glands are unremarkable. No enlarged abdominal  or retroperitoneal lymph nodes. No abnormal bowel distention, free  air, or ascites. No pelvic adenopathy or mass.    No lytic or blastic bone lesions are demonstrated. There has been  prior repair of the left shoulder coronoid process.      Impression    IMPRESSION:  1. Large right renal mass measures up to 9.0 cm.  2. Numerous bilateral pulmonary metastases.  3. Lymph node metastases in bilateral pulmonary mireya and in the  mediastinum.  4. Moderate-to-severe coronary atherosclerosis.    YOSELIN ROWAN MD       RECENT LABS:       Anesthesia Evaluation     .             ROS/MED HX    ENT/Pulmonary: Comment: Pet dander related RAD/asthma, no daily inhalers    (+)allergic rhinitis, asthma Treatment: Inhaler prn,  , . .    Neurologic:  - neg neurologic ROS     Cardiovascular:     (+) hypertension----. : . . . :. .      (-) CHF, KRISHNAMURTHY and arrhythmias   METS/Exercise Tolerance: Comment: Bikes hundreds of miles per week without CP/SOB >4 METS   Hematologic:  - neg hematologic  ROS       Musculoskeletal:  - neg musculoskeletal ROS       GI/Hepatic:  - neg GI/hepatic ROS       Renal/Genitourinary:     (+) Other Renal/ Genitourinary, renal mass      Endo:         Psychiatric:  - neg psychiatric ROS       Infectious Disease:         Malignancy:         Other:                          Physical Exam  Normal systems: dental    Airway   Mallampati: I  TM distance: >3 FB  Neck ROM: full    Dental     Cardiovascular   Rhythm and rate: regular and normal      Pulmonary    breath sounds clear to auscultation            Lab Results   Component Value Date    WBC 6.0 07/29/2019    HGB 12.3 (L) 07/29/2019    HCT 37.5 (L)  "07/29/2019     07/29/2019     07/29/2019    POTASSIUM 4.0 07/29/2019    CHLORIDE 105 07/29/2019    CO2 30 07/29/2019    BUN 17 07/29/2019    CR 0.76 07/29/2019     (H) 07/29/2019    ANITA 9.4 07/29/2019    ALBUMIN 3.6 07/29/2019    PROTTOTAL 7.5 07/29/2019    ALT 23 07/29/2019    AST 20 07/29/2019    ALKPHOS 78 07/29/2019    BILITOTAL 0.2 07/29/2019    TSH 0.90 04/24/2019       Preop Vitals  BP Readings from Last 3 Encounters:   07/31/19 (!) 145/74   07/29/19 132/86   07/29/19 142/85    Pulse Readings from Last 3 Encounters:   07/29/19 56   07/29/19 54   07/22/19 63      Resp Readings from Last 3 Encounters:   07/31/19 16   07/22/19 16   04/17/19 18    SpO2 Readings from Last 3 Encounters:   07/31/19 97%   07/29/19 97%   07/29/19 98%      Temp Readings from Last 1 Encounters:   07/31/19 36.1  C (97  F) (Oral)    Ht Readings from Last 1 Encounters:   07/29/19 1.702 m (5' 7\")      Wt Readings from Last 1 Encounters:   07/31/19 71.2 kg (157 lb)    Estimated body mass index is 24.59 kg/m  as calculated from the following:    Height as of 7/29/19: 1.702 m (5' 7\").    Weight as of this encounter: 71.2 kg (157 lb).       Anesthesia Plan      History & Physical Review  History and physical reviewed and following examination; no interval change.    ASA Status:  2 .    NPO Status:  > 8 hours    Plan for General and ETT with Intravenous and Propofol induction. Maintenance will be Balanced.    PONV prophylaxis:  Ondansetron (or other 5HT-3) and Dexamethasone or Solumedrol  Propofol gtt  hydromorphone      Postoperative Care  Postoperative pain management:  IV analgesics.      Consents  Anesthetic plan, risks, benefits and alternatives discussed with:  Patient..                 Michael Calderon MD  "

## 2019-07-31 NOTE — TELEPHONE ENCOUNTER
I called patient and left a voicemail requesting a return call to confirm the 8/6/19 appt with Dr RANJANA Bragg as requested via inbasket from Salima CHRISTIANSON

## 2019-08-01 NOTE — CONSULTS
"Consult received - \"pt and wife have many questions about best diet for new cancer diagnosis\"    Diet: Low Fiber    Visited with pt and wife this afternoon  Wife notes that in general they eat very healthy  \"I have read a lot about different diets for CA - like no sugar\"  Wondering if there is a special diet for pt to follow at home  Discussed the importance of protein for healing   Encouraged a balanced diet of whole foods - limiting processed foods and added sugars  Wife states that pt will recover for 4 weeks and then a treatment plan will be determined  Mentioned to pt and wife that Oncology clinic will have a dietitian if they have additional questions    Pt is very active  Was scheduled for a 200 mile [employer] bike ride tomorrow  \"Last year he biked to [place] and back in once day because he wanted to!\"    Pt tolerated some macaroni, soup and pudding for lunch  Hopeful that he will be ready for home today    Cindy Woo RD, LD  Clinical Dietitian - [facility]  Pager - [phone]      "

## 2019-08-01 NOTE — PROGRESS NOTES
Urology Daily Progress Note  08/01/2019    24 hour events/Subjective:     - poor pain control overnight, started improving this AM   - hasnt ambulated yet or tolerated clears   - Denies nausea or emesis   - catheter out this AM    O:  Temp:  [98.1  F (36.7  C)-100.1  F (37.8  C)] 98.3  F (36.8  C)  Pulse:  [53-65] 56  Heart Rate:  [52-78] 54  Resp:  [10-19] 16  BP: (126-150)/(72-90) 138/72  SpO2:  [94 %-100 %] 98 %  General: Alert, interactive, & in NAD  Resp: Breathing is non-labored on RA  Cardiac: Extremities warm;   Abdomen: Soft, mildly tender, nondistended. Incision C/D/I.  Extremities: No LE edema or obvious joint abnormalities  Skin: Warm and dry, no jaundice or rash     Ins & Outs (24 hours/since MN)  UOP  2350/400      Labs/Imaging  BMP  Recent Labs   Lab 08/01/19  0714 07/31/19  1100 07/29/19  0926    139 140   POTASSIUM 3.8 4.1 4.0   CHLORIDE 105 105 105   ANITA 8.4* 8.9 9.4   CO2 29 29 30   BUN 14 16 17   CR 0.94 0.84 0.76   * 166* 106*     CBC  Recent Labs   Lab 08/01/19  0714 07/31/19  1100 07/29/19  0926   WBC 9.5 11.7* 6.0   HGB 12.3* 12.0* 12.3*   HCT 37.0* 36.4* 37.5*   MCV 86 87 87    286 320     INRNo lab results found in last 7 days.       Assessment/Plan  58 year old male POD #1 s/p right radical nephrectomy for renal mass. Doing well.    PLAN:  Neuro/Pain: Continue current regimen  CV/PULM: No acute issues. Encourage IS, ambulation  GI/FEN:    - advance diet   - Electrolyte replacement PRN    : TOV this AM  Heme: No active issues  Activity: Up ad wanda  Ppx: SCDs, ambulation, IS,  Dispo: home today vs tomorrow      Will discuss with Dr. Berrios.    --    Leo Akers MD   Urology Resident    8:10 AM, 08/01/2019       Contacting the Urology Team     Please use the following job codes to reach the Urology Team. Note that you must use an in house phone and that job codes cannot receive text pages.     On weekdays, dial [phone] (or star-star-star [phone] on the new [employer] telephones) then  [phone] to reach the Adult Urology resident or PA on call    On weekdays, dial [phone] (or star-star-star [phone] on the new [employer] telephones) then [phone] to reach the Pediatric Urology resident    On weeknights and weekends, dial [phone] (or star-star-star 777 on the new [employer] telephones) then [phone] to reach the Urology resident on call (for both Adult and Pediatrics)

## 2019-08-01 NOTE — PLAN OF CARE
A&O, anxious. VSS, refused capno overnight, continuous pulse ox placed. A1 gb/walker. Could not tolerate walking despite persistent education. Will plan again to pre medicate and ambulate in the AM. Clears, not tolerating d/t gas build up- pt belching and gagging but denies nausea. BS hypoactive, -+flatus.  Rea in place with adequate clear UOP. x3 lap sites and 1 incision, all covered with liquid bandage. Discharge pending improvement.

## 2019-08-01 NOTE — PLAN OF CARE
A&Ox4; VSS. BS active, but faint. Passing flatus; no BM on this shift; senna given x1. C/o incisional pain 6/10; PRN oxy x1 given; effective. Voiding using urinal. Low-fiber; tolerating well. x3 lap sites and 1 incision WDL open to air. Wife at bedside. Discharge planned to home tomorrow.

## 2019-08-01 NOTE — PLAN OF CARE
A&Ox4; VSS. BS active, but faint. Passing flatus; no BM on this shift; senna given x1. C/o incisional pain 6/10; PRN oxy x2 given; effective. Rea removed; max YJN=193. C/o nausea upon standing; zofran given x1; effective. Able to ambulate in rome x2 with assist x1. Diet advanced to low-fiber; tolerating well. PIV infusing @ 100mL/hr. x3 lap sites and 1 incision WDL.

## 2019-08-02 NOTE — PROGRESS NOTES
[facility] Progress Note          Assessment and Plan:   Active Problems:    Renal malignant neoplasm, right (H)    Assessment: POD #2 s/p RIGHT laparoscopic radical nephrectomy     Plan: Progressing well  - discharge to home today  - we discussed his pathology result  - F/U with me scheduled for 8/14/19  - He is scheduled to see Dr. Bragg on 8/6/19      Jas Berrios MD   [facility]  Office: [phone]               Interval History:   doing well; no cp, sob, n/v/d, or abd pain. Voiding well. Passing flatus. Ambulating well              Review of Systems:   The 5 point Review of Systems is negative other than noted in the HPI             Medications:     Current Facility-Administered Medications Ordered in Epic   Medication Dose Route Frequency Last Rate Last Dose     acetaminophen (TYLENOL) tablet 975 mg  975 mg Oral Q6H   975 mg at 08/02/19 0636     albuterol (PROAIR HFA/PROVENTIL HFA/VENTOLIN HFA) 108 (90 Base) MCG/ACT inhaler 2 puff  2 puff Inhalation Q6H PRN         HYDROmorphone (PF) (DILAUDID) injection 0.2 mg  0.2 mg Intravenous Q2H PRN   0.2 mg at 07/31/19 1412     lidocaine (LMX4) cream   Topical Q1H PRN         lidocaine 1 % 0.1-1 mL  0.1-1 mL Other Q1H PRN         naloxone (NARCAN) injection 0.1-0.4 mg  0.1-0.4 mg Intravenous Q2 Min PRN         ondansetron (ZOFRAN-ODT) ODT tab 4 mg  4 mg Oral Q6H PRN   4 mg at 08/01/19 1019    Or     ondansetron (ZOFRAN) injection 4 mg  4 mg Intravenous Q6H PRN         oxyCODONE (ROXICODONE) tablet 5-10 mg  5-10 mg Oral Q4H PRN   5 mg at 08/02/19 0107     prochlorperazine (COMPAZINE) injection 10 mg  10 mg Intravenous Q6H PRN        Or     prochlorperazine (COMPAZINE) tablet 10 mg  10 mg Oral Q6H PRN         senna-docusate (SENOKOT-S/PERICOLACE) 8.6-50 MG per tablet 1 tablet  1 tablet Oral BID PRN   1 tablet at 08/02/19 0636     sodium chloride (PF) 0.9% PF flush 3 mL  3 mL Intracatheter q1 min prn         sodium chloride (PF) 0.9% PF  flush 3 mL  3 mL Intracatheter Q8H         sodium chloride 0.9% infusion   Intravenous Continuous   Stopped at 08/01/19 2104     Current Outpatient Medications Ordered in Epic   Medication     docusate sodium (COLACE) 100 MG tablet     oxyCODONE (ROXICODONE) 5 MG tablet                  Physical Exam:   Vitals were reviewed  Patient Vitals for the past 8 hrs:   BP Temp Temp src Heart Rate Resp SpO2   08/02/19 0819 120/66 98.2  F (36.8  C) Oral 68 16 90 %     GEN: NAD, lying in bed  HEENT: EOMI  NECK: Supple  ABD: soft  INCISIONS: clean, dry and intact   EXT: No LE edema           Data:   No results found for: NTBNPI, NTBNP  Lab Results   Component Value Date    WBC 9.5 08/01/2019    WBC 11.7 (H) 07/31/2019    WBC 6.0 07/29/2019    HGB 12.3 (L) 08/01/2019    HGB 12.0 (L) 07/31/2019    HGB 12.3 (L) 07/29/2019    HCT 37.0 (L) 08/01/2019    HCT 36.4 (L) 07/31/2019    HCT 37.5 (L) 07/29/2019    MCV 86 08/01/2019    MCV 87 07/31/2019    MCV 87 07/29/2019     08/01/2019     07/31/2019     07/29/2019     No results found for: INR    PATHOLOGY:  FINAL DIAGNOSIS:   Kidney, right, radical nephrectomy:   1. Clear cell renal cell carcinoma.  See synoptic summary.   2. Papillary adenoma, forming a 13 mm mass in the superior pole.   3. One lymph node negative for malignancy.     Report Name: Kidney - Nephrectomy, Partial or Radical        Status: Submitted Checklist Inst: 1      Last Updated By: Danny Byrd M.D., 08/01/2019 15:00:57   Part(s) Involved:   A: Kidney, right     Synoptic Report:     SPECIMEN     Procedure:         - Radical nephrectomy     Specimen Laterality:         - Right     TUMOR     Histologic Type:         - Clear cell renal cell carcinoma     Histologic Grade (WHO / ISUP Grade):         - G4: Extreme nuclear pleomorphism and / or multi-nuclear giant   cells and         / or rhabdoid and / or sarcomatoid differentiation     Tumor Size: 7.5 Centimeters (cm)     Tumor Focality:          - Unifocal     Tumor Extent       Tumor Extension:           - Tumor limited to kidney     Accessory Findings       Sarcomatoid Features:           - Not identified       Rhabdoid Features:           - Not identified       Tumor Necrosis:           - Not identified       Lymphovascular Invasion:           - Present     MARGINS     Margins:         - Uninvolved by invasive carcinoma     LYMPH NODES     Number of Lymph Nodes Involved: 0     Number of Lymph Nodes Examined: 1     PATHOLOGIC STAGE CLASSIFICATION (PTNM, AJCC 8TH EDITION)     Primary Tumor (pT):         - pT2a     Regional Lymph Nodes (pN):         - pN0

## 2019-08-02 NOTE — PROGRESS NOTES
Patient discharged at 12:37 PM to Discharged to home  IV was discontinued. Pain at time of discharge was 4/10. Belongings returned to patient.  Discharge instructions and medications reviewed with patient.  Patient verbalized understanding and all questions were answered.  Prescriptions given to patient.  At time of discharge, patient condition was stable and left the unit by wheel chair escorted by wife and [employer] volunteer. Patient to follow-up with Urology and Oncology.

## 2019-08-02 NOTE — PLAN OF CARE
A&O. VSS. C/O pain 3/10, 5mg oxycodone given, effective. A1/SBA ambulating in the halls, walked x1 on nights. Low fiber diet, tolerating. BS active, passing flatus. No BM yet, PRN senna being given. Voiding adequately in bathroom. x3 lap sites and LLQ incision all covered with liquid bandage, WNL. Possibly home today.

## 2019-08-05 NOTE — TELEPHONE ENCOUNTER
"Hospital/TCU/ED for chronic condition Discharge Protocol    \"Hi, my name is Ida Layne, a registered nurse, and I am calling from [facility]  I am calling to follow up and see how things are going for you after your recent emergency visit/hospital/TCU stay.\"    Tell me how you are doing now that you are home?\" I'm doing good. Everything went well. I quit taking Oxycodone yesterday managing things with Tylenol. BM are regular. Urinating well       Discharge Instructions    \"Let's review your discharge instructions.  What is/are the follow-up recommendations?  Pt. Response: f/u with Urology an Oncology.    \"Has an appointment with your primary care provider been scheduled?\"   Not at this time- he will follow up with specialty first and then call if needs too.    \"When you see the provider, I would recommend that you bring your medications with you.\"    Medications    \"Tell me what changed about your medicines when you discharged?\"    Changes to chronic meds?    0-1    \"What questions do you have about your medications?\"    None     New diagnoses of heart failure, COPD, diabetes, or MI?    No              Post Discharge Medication Reconciliation Status: discharge medications reconciled and changed, per note/orders (see AVS).    Was MTM referral placed (*Make sure to put transitions as reason for referral)?   No    Call Summary    \"What questions or concerns do you have about your recent visit and your follow-up care?\"     none    \"If you have questions or things don't continue to improve, we encourage you contact us through the main clinic number (give number).  Even if the clinic is not open, triage nurses are available 24/7 to help you.     We would like you to know that our clinic has extended hours (provide information).  We also have urgent care (provide details on closest location and hours/contact info)\"      \"Thank you for your time and take care!\"       Ida ZARATE RN, BSN, PHN          "

## 2019-08-06 NOTE — NURSING NOTE
Chief Complaint   Patient presents with     Blood Draw     Labs drawn via  by RN in lab.     Labs collected from venipuncture by RN.    Char Ceballos RN

## 2019-08-06 NOTE — NURSING NOTE
"Oncology Rooming Note    August 6, 2019 2:01 PM   Jesus Haley is a 58 year old male who presents for:    Chief Complaint   Patient presents with     Oncology Clinic Visit     New patietn visit related to Metstatic Renal Cell Carcinoma     Initial Vitals: BP (!) 148/84 (BP Location: Left arm, Patient Position: Sitting, Cuff Size: Adult Regular)   Pulse 69   Temp 98.7  F (37.1  C) (Oral)   Resp 16   Ht 1.79 m (5' 10.48\")   Wt 71.5 kg (157 lb 11.2 oz)   SpO2 97%   BMI 22.32 kg/m   Estimated body mass index is 22.32 kg/m  as calculated from the following:    Height as of this encounter: 1.79 m (5' 10.48\").    Weight as of this encounter: 71.5 kg (157 lb 11.2 oz). Body surface area is 1.89 meters squared.  Moderate Pain (5) Comment: Data Unavailable   No LMP for male patient.  Allergies reviewed: Yes  Medications reviewed: Yes    Medications: Medication refills not needed today.  Pharmacy name entered into [facility]: [facility] [id number] [facility] [address]    Clinical concerns: Patient complains of pain during exhalation started 2 days ago. Provider was notified.      Fidelia Lopez LPN            "

## 2019-08-06 NOTE — LETTER
8/6/2019       RE: Jesus Haley  [address] [place] [id number]     Dear Colleague,    Thank you for referring your patient, Jesus Haley, to the [facility]. Please see a copy of my visit note below.    MEDICAL ONCOLOGY NEW PATIENT CLINIC NOTE      DATE OF VISIT: 08/06/2019      REFERRING PHYSICIAN:  Chaitanya Disla MD, [facility], Medical Oncology.      REASON FOR CONSULTATION:  New diagnosis of metastatic kidney cancer, here for discussion of IL-2 therapy.      HISTORY OF PRESENT ILLNESS:  Mr. Jesus Haley is a delightful 58-year-old gentleman who was recently diagnosed with metastatic clear cell renal cell carcinoma.  His oncologic history is detailed as under.      At this time, the patient is status post right radical nephrectomy performed by Dr. Jas Berrios on 07/31/2019.  He is recovering very well from the surgery and pain is well controlled except for when he has bouts of coughing.  He was given oxycodone prescription but noted some confusion with it and hence, not taking.  Using Tylenol for pain control.  He does report shortness of breath, especially when exhaling as his inspirations have been shallower than usual.  He has been able to go back to work and was able to work half a day today.  Otherwise, no signs or symptoms of distant metastatic spread, including CNS symptoms, etc.      He wants to get started on treatment right away.  His wife accompanies him for this visit.      ONCOLOGIC HISTORY:   1. Clear cell renal cell carcinoma, stage IV ([id number]), de claudio metastatic, IMDC poor-risk.   -- 01/2019 to 06/2019:  Patient had an upper respiratory tract infection in 01/2019 which resolved, but then he started having intermittent episodes of wheezing and chest congestion as well as productive cough without any fevers or chills.  He is a nonsmoker.  No weight loss.     -- 07/22/2019:  Patient presented to Braulio Wei MD, his  primary care provider who ordered a chest x-ray, 2 view, which was done later that day and showed numerous bilateral pulmonary nodules measuring up to 1.8 cm on the left and 2 cm on the right with paramidline mass measuring 4.1 cm and probable left hilar and mediastinal lymphadenopathy.  A CT chest was recommended.   -- 07/25/2019 CT chest without contrast showed a partially imaged complex mass with associated calcifications in the right kidney measuring at least 6.3 cm in size with findings concerning for renal cell carcinoma and probable lymph node metastases.  Of note, the patient had multiple large, noncalcified masses in both lungs including dominant nodule along the pleural surface of the medial right lower lobe measuring 5.1 x 2 cm, another abutting the major fissure measuring up to 2.6 cm and numerous additional nodules measuring well over 1 cm at lung bases along with several additional upper lobe nodules present predominantly in the left lung measuring approximately 1-2 cm in size.  Also noted was enlarged left hilar lymph node measuring 2.8 cm without associated calcifications concerning for metastatic adenopathy as well as a large right costophrenic mass which could represent a pleural-based right middle lobe mass versus cardiophrenic lymph node measuring 4.2 x 3.6 cm as well as subcarinal lymph node measuring 3.1 x 2.4 cm.  No liver or bone lesions seen.   -- 07/26/2019:  CT abdomen and pelvis with contrast showed heterogeneous mass in posterior right kidney measuring 9 x 6 x 7.3 cm with evidence of neovascularization and patent right renal vein.  Left kidney appears normal.  No enlarged abdominal or retroperitoneal lymph nodes.  No pelvic adenopathy or mass.  Numerous bilateral lung metastases, the largest of which is pleural based mass in the medial right lower lobe measuring up to 5.1 cm.  There are also bulky metastatic lymph nodes in hilar and mediastinum, including a subcarinal lymph node that  measures 3.8 x 2.8 cm, as well as a left hilar lymph node that measures 3.1 x 2.8 cm.  No lytic or blastic bone lesions demonstrated.  The liver, gallbladder, spleen, pancreas, adrenal glands are unremarkable.  No ascites.   -- 07/29/2019 seen by Dr. Chaitanya Disla who recommended nephrectomy followed by systemic therapy.   -- 07/31/2019:  Laparoscopic right radical nephrectomy performed by Dr. Jas Berrios.  The surgical note does not report any intraoperative tumor spillage.  Pathology from this showed clear cell renal cell carcinoma, WHO grade IV, without rhabdoid features or sarcomatoid features.  Tumor was 7.5 cm in size and unifocal, limited to the kidney with negative margins.  Lymphovascular invasion present.  Lymph nodes:  1 sampled and negative, [id number].        REVIEW OF SYSTEMS:  A 14-point review of systems is negative except as above.      PAST MEDICAL HISTORY:   1.  Kidney cancer as above.   2.  Possible coronary artery disease as above.   3.  Allergies, seasonal.   4.  Hypertension.      PAST SURGICAL HISTORY:    1. Right radical nephrectomy.   2. History of right shoulder trauma, status post ORIF.   3. History of right leg surgery.      SOCIAL HISTORY:    The patient is a never smoker and quit alcohol decades ago.  No illicit drug use.      FAMILY HISTORY:   Maternal uncle had clear cell renal cell carcinoma diagnosed at age 76 but no other family history of malignancies.     ALLERGY:    Allergies   Allergen Reactions     No Known Allergies      Zyrtec [Cetirizine]      Difficulty breathing      MEDICATIONS:   Current Outpatient Medications:      acetaminophen (TYLENOL) 500 MG tablet, Take 500-1,000 mg by mouth every 6 hours as needed for mild pain, Disp: , Rfl:      albuterol (VENTOLIN HFA) 108 (90 Base) MCG/ACT inhaler, Inhale 2 puffs into the lungs every 6 hours as needed for shortness of breath / dyspnea or wheezing, Disp: 3 Inhaler, Rfl: 3     docusate sodium (COLACE) 100 MG tablet, Take 1 tablet  "(100 mg) by mouth daily (Patient taking differently: Take 100 mg by mouth every 24 hours ), Disp: 60 tablet, Rfl: 1     FLUZONE QUADRIVALENT 0.5 ML injection, TO BE ADMINISTERED BY PHARMACIST FOR IMMUNIZATION, Disp: , Rfl: 0     traMADol (ULTRAM) 50 MG tablet, Take 1 tablet (50 mg) by mouth every 8 hours as needed for severe pain, Disp: 20 tablet, Rfl: 1     fish oil-omega-3 fatty acids (OMEGA-3 FISH OIL) 1000 MG capsule, Take 1 g by mouth 2 times daily , Disp: , Rfl:      oxyCODONE (ROXICODONE) 5 MG tablet, Take 1 tablet (5 mg) by mouth every 6 hours as needed for breakthrough pain, Disp: 9 tablet, Rfl: 0     oxyCODONE (ROXICODONE) 5 MG tablet, Take 1 tablet (5 mg) by mouth every 6 hours as needed for breakthrough pain (Patient not taking: Reported on 8/6/2019), Disp: 9 tablet, Rfl: 0    PHYSICAL EXAMINATION:   VITAL SIGNS:  BP (!) 148/84 (BP Location: Left arm, Patient Position: Sitting, Cuff Size: Adult Regular)   Pulse 69   Temp 98.7  F (37.1  C) (Oral)   Resp 16   Ht 1.79 m (5' 10.48\")   Wt 71.5 kg (157 lb 11.2 oz)   SpO2 97%   BMI 22.32 kg/m     GENERAL:  Middle-aged, well-nourished gentleman in a chair, in no acute distress.   HEENT:  Pupils equal, reactive to light and accommodation.  Extraocular movements intact.  Mucous membranes moist.   NECK:  Without any JVD or lymphadenopathy.   CHEST:  Good air entry bilaterally, normal work of breathing.   CARDIOVASCULAR:  S1, S2 normal.  No murmurs, rubs or gallops.   ABDOMEN:  Soft, nontender, nondistended.  Bowel sounds present.   MUSCULOSKELETAL:  Good range of motion of all extremities.  No clubbing, cyanosis or edema.   NEUROLOGIC:  Cranial nerves II-XII grossly intact.  No focal deficits.   PSYCHIATRIC:  Mood and affect normal.      LABORATORY DATA:    Labs from after my clinic visit today showed white count 11,600 with ANC of 9300, hemoglobin 11.8 with MCV 88, RDW 12.5, platelet count 332,000.  Creatinine 1.06 with EGFR of 76, calcium 9.1, albumin 3.4. "  Normal LFTs with total bilirubin 0.2, alkaline phosphatase 73, .  Normal total cholesterol of 147, LDL 79, non-HDL cholesterol 107, triglycerides 142.  TSH 0.66.      RADIOGRAPHIC AND PATHOLOGY DATA:    As above.      ASSESSMENT AND PLAN: A 58-year-old gentleman with de claudio metastatic, IMDC poor-risk, clear cell renal cell carcinoma of right kidney, status post right radical nephrectomy, who is here for discussion of treatment options.      1.  Metastatic RCC.  - I reviewed the available diagnostic data at length with the patient and his wife and explained that the pulmonary findings almost certainly represent metastatic and incurable disease.  They did bring up the point of getting a metastatic lesion biopsy to confirm diagnosis but after our discussion declined this intervention.   - He does have substantial pulmonary metastatic disease burden, and with the shortness of breath, I do remain concerned about him needing an urgent reduction in tumor burden.   - We do not have any clinical trials available at this time.   - We discussed the available systemic therapy options including high-dose IL-2, which would comprise of up to 15 doses given every 8 hours in the hospital on days 1 and 15 for up to 3 courses and would be estimated to result in a complete response rate of about 9%, partial response rate of about 18% but disease progression in the majority of patients. Alternatives including nivolumab plus ipilimumab which is FDA approved in this setting and results in overall response rate of about 42% with complete response rate of about 10% and improvement in overall survival compared with sunitinib alone as in CheckMate 214 trial; as well as axitinib plus pembrolizumab which results in overall response rate of about 59% and an improvement in PFS as well as overall survival compared to sunitinib alone as shown in KEYNOTE-426 trial were discussed.   - Information provided about axitinib and pembrolizumab  therapy as well as IL-2 therapy as these are the 2 options the patient was most interested in.  He is leaning more towards axitinib plus pembrolizumab but I did spend considerable time discussing risks and benefits of each of the regimen and the agents in particular.  Axitinib will be given as 5 mg by mouth b.i.d. with pembrolizumab 200 mg IV once every 3 weeks for a maximum of 24 months.   - Because the patient is recovering from the surgery, we still have a few weeks before starting treatment.  Based on the stated preference, I will inform the oral chemo team to start working on prior authorization for axitinib plus pembrolizumab regimen with plan to start this treatment in approximately 4 weeks from now.   - I reviewed the role of chemotherapy port and the patient will call us back to let us know if he is interested in getting this placed before starting systemic therapy.   - No need for MRI of brain or nuclear medicine bone scan as the patient is asymptomatic from these systems standpoint.      2.  Postsurgical pain.    - Mr. Haley does not want to take oxycodone but has substantial difficulty with pain control, especially when he coughs or takes a deep breath.    - He was agreeable to taking tramadol and a short-duration prescription was provided after discussion of side effects.      3.  Shortness of breath with cough.    - Likely secondary to pulmonary metastatic disease as well as atelectasis from decreased inspiratory phase.  Pain management as above and systemic therapy as above.      4.  Possible Genetics referral.   - Will review at future followup visits.      RTC 3-4 weeks.    The patient and wife were given the opportunity to ask questions, which were answered to their satisfaction.  They are in complete agreement with this plan.       Billing [id number]      Waqas Bragg M.D.  . Professor of Medicine  Genitourinary Oncology  Division of Hematology, Oncology & Transplantation  [facility]  [place]    cc:   Chaitanya Disla MD   [facility]   [address], [address]   [place]  [address]                  D: 2019   T: 2019   MT: [email or web address]      Name:     GLADYS DOWD   MRN:      [id number]        Account:      [id number]   :      1960           Service Date: 2019      Document: [id number]

## 2019-08-06 NOTE — DISCHARGE SUMMARY
[facility]    Discharge Summary  Urology    Date of Admission:  7/31/2019  Date of Discharge:  8/2/2019 12:37 PM  Discharging Provider: Jas Berrios    Discharge Diagnoses   Patient Active Problem List   Diagnosis     Routine general medical examination at a health care facility     Essential hypertension, benign     Right kidney mass     Pulmonary nodules     Renal malignant neoplasm, right (H)       Procedure/Surgery Information   Procedure: Procedure(s):  RIGHT LAPAROSCOPIC RADICAL NEPHRECTOMY   Surgeon(s): Surgeon(s) and Role:     * Jas Berrios MD - Primary     * Brenda Benitez PA-C - Assisting   Specimens: ID Type Source Tests Collected by Time Destination   A : Right kidney Tissue Kidney, Right SURGICAL PATHOLOGY EXAM Jas Berrios MD 7/31/2019  9:59 AM       Non-operative procedures None performed     History of Present Illness   Jesus Haley is a 58 year old male who presented with metastatic RIGHT 9cm renal mass with pulmonary and mediastinal adenopathy. He was counseled on treatment options and elected to proceed with the above surgery.     Hospital Course   Jesus Haley was admitted on 7/31/2019.  The following problems were addressed during his hospitalization:  Active Problems:    Renal malignant neoplasm, right (H)      Post-operative pain control: included Hydromorphone (dilaudid) IV and Tylenol and Oxycodone and will be Oxycodone and Tylenol on discharge.     Medications discontinued or adjusted during this hospitalization: No change     Antibiotics prescribed at discharge: None prescribed     Imaging study follow up needs:   -None performed    Significant Findings: RIGHT kidney removed    Pathology:  FINAL DIAGNOSIS:   Kidney, right, radical nephrectomy:   1. Clear cell renal cell carcinoma.  See synoptic summary.   2. Papillary adenoma, forming a 13 mm mass in the superior pole.   3. One lymph node negative for malignancy.     Report Name: Kidney - Nephrectomy,  Partial or Radical        Status: Submitted Checklist Inst: 1      Last Updated By: Danny Byrd M.D., 08/01/2019 15:00:57   Part(s) Involved:   A: Kidney, right     Synoptic Report:     SPECIMEN     Procedure:         - Radical nephrectomy     Specimen Laterality:         - Right     TUMOR     Histologic Type:         - Clear cell renal cell carcinoma     Histologic Grade (WHO / ISUP Grade):         - G4: Extreme nuclear pleomorphism and / or multi-nuclear giant   cells and         / or rhabdoid and / or sarcomatoid differentiation     Tumor Size: 7.5 Centimeters (cm)     Tumor Focality:         - Unifocal     Tumor Extent       Tumor Extension:           - Tumor limited to kidney     Accessory Findings       Sarcomatoid Features:           - Not identified       Rhabdoid Features:           - Not identified       Tumor Necrosis:           - Not identified       Lymphovascular Invasion:           - Present     MARGINS     Margins:         - Uninvolved by invasive carcinoma     LYMPH NODES     Number of Lymph Nodes Involved: 0     Number of Lymph Nodes Examined: 1     PATHOLOGIC STAGE CLASSIFICATION (PTNM, AJCC 8TH EDITION)     Primary Tumor (pT):         - pT2a     Regional Lymph Nodes (pN):         - pN0              Jas Berrios    Discharge Disposition   Discharged to home   Condition at discharge: Stable    Pending Results   Final pathology results: discussed with the patient prior to discharge - see above    Unresulted Labs Ordered in the Past 30 Days of this Admission     No orders found from 7/1/2019 to 8/1/2019.          Primary Care Physician   Braulio Wei    0    Consultations This Hospital Stay   NUTR[facility] SERVICES ADULT IP CONSULT    Time Spent on this Encounter   I have spent greater than 30 minutes on this discharge.    Discharge Orders   No discharge procedures on file.  Discharge Medications   Discharge Medication List as of 8/2/2019 11:54 AM      START taking these medications     Details   docusate sodium (COLACE) 100 MG tablet Take 1 tablet (100 mg) by mouth daily, Disp-60 tablet, R-1, E-Prescribe      oxyCODONE (ROXICODONE) 5 MG tablet Take 1 tablet (5 mg) by mouth every 6 hours as needed for breakthrough pain, Disp-9 tablet, R-0, Local Print         CONTINUE these medications which have NOT CHANGED    Details   albuterol (VENTOLIN HFA) 108 (90 Base) MCG/ACT inhaler Inhale 2 puffs into the lungs every 6 hours as needed for shortness of breath / dyspnea or wheezing, Disp-3 Inhaler, R-3, E-Prescribe      fish oil-omega-3 fatty acids (OMEGA-3 FISH OIL) 1000 MG capsule Take 1 g by mouth 2 times daily , Historical      acetaminophen (TYLENOL) 500 MG tablet Take 500-1,000 mg by mouth every 6 hours as needed for mild pain, Historical         STOP taking these medications       ibuprofen (ADVIL/MOTRIN) 200 MG tablet Comments:   Reason for Stopping:             Allergies   Allergies   Allergen Reactions     No Known Allergies      Zyrtec [Cetirizine]      Difficulty breathing     Data   Most Recent 3 CBC's:  Recent Labs   Lab Test 08/01/19  0714 07/31/19  1100 07/29/19  0926   WBC 9.5 11.7* 6.0   HGB 12.3* 12.0* 12.3*   MCV 86 87 87    286 320      Most Recent 3 BMP's:  Recent Labs   Lab Test 08/01/19  0714 07/31/19  1100 07/29/19  0926    139 140   POTASSIUM 3.8 4.1 4.0   CHLORIDE 105 105 105   CO2 29 29 30   BUN 14 16 17   CR 0.94 0.84 0.76   ANIONGAP 4 5 5   ANITA 8.4* 8.9 9.4   * 166* 106*     Most Recent 2 LFT's:  Recent Labs   Lab Test 07/29/19  0926 04/24/19  0838   AST 20 22   ALT 23 27   ALKPHOS 78 65   BILITOTAL 0.2 0.5     Most Recent INR's and Anticoagulation Dosing History:  Anticoagulation Dose History     There is no flowsheet data to display.        Most Recent 3 Troponin's:No lab results found.  Most Recent Cholesterol Panel:  Recent Labs   Lab Test 04/24/19  0838   CHOL 228*   *   HDL 78   TRIG 45     Most Recent 6 Bacteria Isolates From Any Culture (See  EPIC Reports for Culture Details):No lab results found.  Most Recent TSH, T4 and A1c Labs:  Recent Labs   Lab Test 04/24/19  0838   TSH 0.90

## 2019-08-07 NOTE — TELEPHONE ENCOUNTER
[facility]    Phone Message    May a detailed message be left on voicemail: yes    Reason for Call: Other: Lala calling to request a call back. She states she has some questions on pts post op. Please call her back to discuss.      Action Taken: Message routed to:  [facility] ([facility] huong uro

## 2019-08-07 NOTE — TELEPHONE ENCOUNTER
Lala called on behalf of patient and wanted to address two concerns to MD. She wanted to let MD know that her  is experiencing excessive night sweats/slight chills. No fever, but is taking back to back tylenol. Also wanted to let MD know that patient is down to 4 oxycodone and was told to wean-off. However, patient is breathing more heavily when off of medication. She wanted to know if patient could get a refill. Will forward message to MD.     Yari Miller LPN

## 2019-08-07 NOTE — PROGRESS NOTES
MEDICAL ONCOLOGY NEW PATIENT CLINIC NOTE      DATE OF VISIT: 08/06/2019      REFERRING PHYSICIAN:  Chaitanya Disla MD, [facility], Medical Oncology.      REASON FOR CONSULTATION:  New diagnosis of metastatic kidney cancer, here for discussion of IL-2 therapy.      HISTORY OF PRESENT ILLNESS:  Mr. Jesus Haley is a delightful 58-year-old gentleman who was recently diagnosed with metastatic clear cell renal cell carcinoma.  His oncologic history is detailed as under.      At this time, the patient is status post right radical nephrectomy performed by Dr. Jas Berrios on 07/31/2019.  He is recovering very well from the surgery and pain is well controlled except for when he has bouts of coughing.  He was given oxycodone prescription but noted some confusion with it and hence, not taking.  Using Tylenol for pain control.  He does report shortness of breath, especially when exhaling as his inspirations have been shallower than usual.  He has been able to go back to work and was able to work half a day today.  Otherwise, no signs or symptoms of distant metastatic spread, including CNS symptoms, etc.      He wants to get started on treatment right away.  His wife accompanies him for this visit.      ONCOLOGIC HISTORY:   1. Clear cell renal cell carcinoma, stage IV ([id number]), de claudio metastatic, IMDC poor-risk.   -- 01/2019 to 06/2019:  Patient had an upper respiratory tract infection in 01/2019 which resolved, but then he started having intermittent episodes of wheezing and chest congestion as well as productive cough without any fevers or chills.  He is a nonsmoker.  No weight loss.     -- 07/22/2019:  Patient presented to Braulio Wei MD, his primary care provider who ordered a chest x-ray, 2 view, which was done later that day and showed numerous bilateral pulmonary nodules measuring up to 1.8 cm on the left and 2 cm on the right with paramidline mass measuring 4.1 cm and probable left hilar  and mediastinal lymphadenopathy.  A CT chest was recommended.   -- 07/25/2019 CT chest without contrast showed a partially imaged complex mass with associated calcifications in the right kidney measuring at least 6.3 cm in size with findings concerning for renal cell carcinoma and probable lymph node metastases.  Of note, the patient had multiple large, noncalcified masses in both lungs including dominant nodule along the pleural surface of the medial right lower lobe measuring 5.1 x 2 cm, another abutting the major fissure measuring up to 2.6 cm and numerous additional nodules measuring well over 1 cm at lung bases along with several additional upper lobe nodules present predominantly in the left lung measuring approximately 1-2 cm in size.  Also noted was enlarged left hilar lymph node measuring 2.8 cm without associated calcifications concerning for metastatic adenopathy as well as a large right costophrenic mass which could represent a pleural-based right middle lobe mass versus cardiophrenic lymph node measuring 4.2 x 3.6 cm as well as subcarinal lymph node measuring 3.1 x 2.4 cm.  No liver or bone lesions seen.   -- 07/26/2019:  CT abdomen and pelvis with contrast showed heterogeneous mass in posterior right kidney measuring 9 x 6 x 7.3 cm with evidence of neovascularization and patent right renal vein.  Left kidney appears normal.  No enlarged abdominal or retroperitoneal lymph nodes.  No pelvic adenopathy or mass.  Numerous bilateral lung metastases, the largest of which is pleural based mass in the medial right lower lobe measuring up to 5.1 cm.  There are also bulky metastatic lymph nodes in hilar and mediastinum, including a subcarinal lymph node that measures 3.8 x 2.8 cm, as well as a left hilar lymph node that measures 3.1 x 2.8 cm.  No lytic or blastic bone lesions demonstrated.  The liver, gallbladder, spleen, pancreas, adrenal glands are unremarkable.  No ascites.   -- 07/29/2019 seen by   Chaitanya Disla who recommended nephrectomy followed by systemic therapy.   -- 07/31/2019:  Laparoscopic right radical nephrectomy performed by Dr. Jas Berrios.  The surgical note does not report any intraoperative tumor spillage.  Pathology from this showed clear cell renal cell carcinoma, WHO grade IV, without rhabdoid features or sarcomatoid features.  Tumor was 7.5 cm in size and unifocal, limited to the kidney with negative margins.  Lymphovascular invasion present.  Lymph nodes:  1 sampled and negative, [id number].        REVIEW OF SYSTEMS:  A 14-point review of systems is negative except as above.      PAST MEDICAL HISTORY:   1.  Kidney cancer as above.   2.  Possible coronary artery disease as above.   3.  Allergies, seasonal.   4.  Hypertension.      PAST SURGICAL HISTORY:    1. Right radical nephrectomy.   2. History of right shoulder trauma, status post ORIF.   3. History of right leg surgery.      SOCIAL HISTORY:    The patient is a never smoker and quit alcohol decades ago.  No illicit drug use.      FAMILY HISTORY:   Maternal uncle had clear cell renal cell carcinoma diagnosed at age 76 but no other family history of malignancies.     ALLERGY:    Allergies   Allergen Reactions     No Known Allergies      Zyrtec [Cetirizine]      Difficulty breathing      MEDICATIONS:   Current Outpatient Medications:      acetaminophen (TYLENOL) 500 MG tablet, Take 500-1,000 mg by mouth every 6 hours as needed for mild pain, Disp: , Rfl:      albuterol (VENTOLIN HFA) 108 (90 Base) MCG/ACT inhaler, Inhale 2 puffs into the lungs every 6 hours as needed for shortness of breath / dyspnea or wheezing, Disp: 3 Inhaler, Rfl: 3     docusate sodium (COLACE) 100 MG tablet, Take 1 tablet (100 mg) by mouth daily (Patient taking differently: Take 100 mg by mouth every 24 hours ), Disp: 60 tablet, Rfl: 1     FLUZONE QUADRIVALENT 0.5 ML injection, TO BE ADMINISTERED BY PHARMACIST FOR IMMUNIZATION, Disp: , Rfl: 0     traMADol (ULTRAM) 50  "MG tablet, Take 1 tablet (50 mg) by mouth every 8 hours as needed for severe pain, Disp: 20 tablet, Rfl: 1     fish oil-omega-3 fatty acids (OMEGA-3 FISH OIL) 1000 MG capsule, Take 1 g by mouth 2 times daily , Disp: , Rfl:      oxyCODONE (ROXICODONE) 5 MG tablet, Take 1 tablet (5 mg) by mouth every 6 hours as needed for breakthrough pain, Disp: 9 tablet, Rfl: 0     oxyCODONE (ROXICODONE) 5 MG tablet, Take 1 tablet (5 mg) by mouth every 6 hours as needed for breakthrough pain (Patient not taking: Reported on 8/6/2019), Disp: 9 tablet, Rfl: 0    PHYSICAL EXAMINATION:   VITAL SIGNS:  BP (!) 148/84 (BP Location: Left arm, Patient Position: Sitting, Cuff Size: Adult Regular)   Pulse 69   Temp 98.7  F (37.1  C) (Oral)   Resp 16   Ht 1.79 m (5' 10.48\")   Wt 71.5 kg (157 lb 11.2 oz)   SpO2 97%   BMI 22.32 kg/m    GENERAL:  Middle-aged, well-nourished gentleman in a chair, in no acute distress.   HEENT:  Pupils equal, reactive to light and accommodation.  Extraocular movements intact.  Mucous membranes moist.   NECK:  Without any JVD or lymphadenopathy.   CHEST:  Good air entry bilaterally, normal work of breathing.   CARDIOVASCULAR:  S1, S2 normal.  No murmurs, rubs or gallops.   ABDOMEN:  Soft, nontender, nondistended.  Bowel sounds present.   MUSCULOSKELETAL:  Good range of motion of all extremities.  No clubbing, cyanosis or edema.   NEUROLOGIC:  Cranial nerves II-XII grossly intact.  No focal deficits.   PSYCHIATRIC:  Mood and affect normal.      LABORATORY DATA:    Labs from after my clinic visit today showed white count 11,600 with ANC of 9300, hemoglobin 11.8 with MCV 88, RDW 12.5, platelet count 332,000.  Creatinine 1.06 with EGFR of 76, calcium 9.1, albumin 3.4.  Normal LFTs with total bilirubin 0.2, alkaline phosphatase 73, .  Normal total cholesterol of 147, LDL 79, non-HDL cholesterol 107, triglycerides 142.  TSH 0.66.      RADIOGRAPHIC AND PATHOLOGY DATA:    As above.      ASSESSMENT AND PLAN: A " 58-year-old gentleman with de claudio metastatic, IMDC poor-risk, clear cell renal cell carcinoma of right kidney, status post right radical nephrectomy, who is here for discussion of treatment options.      1.  Metastatic RCC.  - I reviewed the available diagnostic data at length with the patient and his wife and explained that the pulmonary findings almost certainly represent metastatic and incurable disease.  They did bring up the point of getting a metastatic lesion biopsy to confirm diagnosis but after our discussion declined this intervention.   - He does have substantial pulmonary metastatic disease burden, and with the shortness of breath, I do remain concerned about him needing an urgent reduction in tumor burden.   - We do not have any clinical trials available at this time.   - We discussed the available systemic therapy options including high-dose IL-2, which would comprise of up to 15 doses given every 8 hours in the hospital on days 1 and 15 for up to 3 courses and would be estimated to result in a complete response rate of about 9%, partial response rate of about 18% but disease progression in the majority of patients. Alternatives including nivolumab plus ipilimumab which is FDA approved in this setting and results in overall response rate of about 42% with complete response rate of about 10% and improvement in overall survival compared with sunitinib alone as in CheckMate 214 trial; as well as axitinib plus pembrolizumab which results in overall response rate of about 59% and an improvement in PFS as well as overall survival compared to sunitinib alone as shown in KEYNOTE-426 trial were discussed.   - Information provided about axitinib and pembrolizumab therapy as well as IL-2 therapy as these are the 2 options the patient was most interested in.  He is leaning more towards axitinib plus pembrolizumab but I did spend considerable time discussing risks and benefits of each of the regimen and the agents  in particular.  Axitinib will be given as 5 mg by mouth b.i.d. with pembrolizumab 200 mg IV once every 3 weeks for a maximum of 24 months.   - Because the patient is recovering from the surgery, we still have a few weeks before starting treatment.  Based on the stated preference, I will inform the oral chemo team to start working on prior authorization for axitinib plus pembrolizumab regimen with plan to start this treatment in approximately 4 weeks from now.   - I reviewed the role of chemotherapy port and the patient will call us back to let us know if he is interested in getting this placed before starting systemic therapy.   - No need for MRI of brain or nuclear medicine bone scan as the patient is asymptomatic from these systems standpoint.      2.  Postsurgical pain.    - Mr. Dowd does not want to take oxycodone but has substantial difficulty with pain control, especially when he coughs or takes a deep breath.    - He was agreeable to taking tramadol and a short-duration prescription was provided after discussion of side effects.      3.  Shortness of breath with cough.    - Likely secondary to pulmonary metastatic disease as well as atelectasis from decreased inspiratory phase.  Pain management as above and systemic therapy as above.      4.  Possible Genetics referral.   - Will review at future followup visits.      RTC 3-4 weeks.    The patient and wife were given the opportunity to ask questions, which were answered to their satisfaction.  They are in complete agreement with this plan.       Billing [id number]      Waqas Bragg M.D.  Nellyt. Professor of Medicine  Genitourinary Oncology  Division of Hematology, Oncology & Transplantation  [facility]    cc:   Chaitanya Disla MD   [facility]   [address]  [address]                  D: 2019   T: 2019   MT: jaleel      Name:     GLADYS DOWD   MRN:      [id number]        Account:      [id number]   :       1960           Service Date: 08/06/2019      Document: [id number]

## 2019-08-07 NOTE — PROGRESS NOTES
Patient's wife , Lala, called requesting a return call to discuss the plan of care.     I spoke with Dr. Disla and he would like to go forward with lung biopsy once patient has recovered. Patient is scheduled for 8/14 with Dr. Disla.     I spoke with Lala, and she states that they are initiating a  2nd opinion at [facility]. [facility] would like patient to have lung biopsy done prior to initial consult. Dr. Disla wants to evaluate patient prior to scheduling biopsy, lala agreed with plan to schedule biopsy when seen on 8/14.    Adriana Nazario

## 2019-08-08 NOTE — TELEPHONE ENCOUNTER
Per MD-Yes, I printed and script and will have it at the desk if they want to pick it up tomorrow. Encourage him to ambulated and continued to work on taking deep breaths.    Kacey from the call center was informed that script was at the  for patient to pick-up.     Yari Miller LPN

## 2019-08-08 NOTE — TELEPHONE ENCOUNTER
Prior Authorization Approval    Authorization Effective Date: 8/6/2019  Authorization Expiration Date: 2/6/2020  Medication: Inlyta 5mg- pa approved  Approved Dose/Quantity: 42/21ds- split fills  Reference #: [id number]   Insurance Company: [employer] - Phone [phone] Fax [phone]  Expected CoPay: $0     CoPay Card Available:      Foundation Assistance Needed:    Which Pharmacy is filling the prescription (Not needed for infusion/clinic administered): [facility] - [place], [place] - [address]  Pharmacy Notified: Yes  Patient Notified: Yes

## 2019-08-08 NOTE — TELEPHONE ENCOUNTER
Patient;s wife was concerned that patient would need more pain medication.   Per MD-make sure he's taking 1000mg Tylenol every 6 hours and then he can alternate the oxycodone and tramadol. To prevent a pneumonia, it is important that he take 10 deep breaths/hour. Not sure if they took the incentive spirometer home from the hospital, but if so he can continue to use this.   Thanks,   Jas Berrios M.D.     Informed patient's wife of Md's instructions. She will take this advice and follow-up as planned.     Yari Miller LPN

## 2019-08-09 NOTE — TELEPHONE ENCOUNTER
Spoke to patient and informed of $0 copay on Inlyta. He was excited to hear they would not go bankrupt for this medication. Mentioned that I will inform the pharmacy when to fill the medication. He said he has scheduled an appt with Dr. Disla for a lung biopsy as well as a consultation for a second opinion with a provider at [facility]. Forwarding concerns for appt to [facility] to address.   Lorraine [facility]  Oncology Clinic Liaison  [address], [address]  [place] [address]  Ph: [phone]/Fax: [phone]

## 2019-08-14 NOTE — Clinical Note
"    8/14/2019         RE: Jesus Haley  [address]  [facility] [address]        Dear Colleague,    Thank you for referring your patient, Jesus Haley, to the [facility]. Please see a copy of my visit note below.    Oncology Rooming Note    August 14, 2019 2:55 PM   Jesus Haley is a 58 year old male who presents for:    Chief Complaint   Patient presents with     Oncology Clinic Visit     Initial Vitals: /76   Pulse 58   Temp 98.2  F (36.8  C) (Oral)   Resp 16   Ht 1.702 m (5' 7\")   Wt 71.3 kg (157 lb 3.2 oz)   SpO2 99%   BMI 24.62 kg/m    Estimated body mass index is 24.62 kg/m  as calculated from the following:    Height as of this encounter: 1.702 m (5' 7\").    Weight as of this encounter: 71.3 kg (157 lb 3.2 oz). Body surface area is 1.84 meters squared.  No Pain (0) Comment: Data Unavailable   No LMP for male patient.  Allergies reviewed: Yes  Medications reviewed: Yes    Medications: Medication refills not needed today.  Pharmacy name entered into [employer] [id number] [place] [facility] [place], [place] - [address]    Clinical concerns:  doctor was notified.      Belen Mina, [facility]              Visit Date:   08/14/2019      SUBJECTIVE:  Mr. Haley is a 58-year-old gentleman with metastatic renal cell carcinoma.      The patient had a laparoscopic right radical nephrectomy on 07/31/2019.  Pathology reveals clear cell renal cell carcinoma measuring 7.5 cm.  Grade IV.  Tumor was unifocal.  No sarcomatoid features.  There was lymphovascular invasion.  Margins negative.  One benign lymph node.  There was papillary adenoma measuring 13 mm in the superior pole.  The patient has pT2a pN0 M0 disease.      He is recovering well from surgery.  Mild abdominal discomfort.      No headache or dizziness.  No chest pain.  He has some cough.  Some shortness of breath on exertion.  No nausea or vomiting.      PHYSICAL EXAMINATION:   GENERAL:  The patient was alert and oriented x " 3.   VITAL SIGNS:  Reviewed.  ECOG PS of 1.   The rest of the systems not examined.      LABORATORY DATA:  Reviewed.      ASSESSMENT:   1.  A 58-year-old gentleman with metastatic right kidney, clear cell renal cell carcinoma with lung metastasis.   2.  Seasonal allergies.      PLAN:   1.  I had a long discussion with the patient and his wife.  Reviewed the pathology report in detail.  He has clear cell carcinoma.   2.  Discussed regarding further treatment.  The patient does have multiple lung metastases.  We had discussed regarding biopsy.  The patient wants to get a biopsy.  That will be scheduled.  I explained to them that this is likely renal cell carcinoma, but we will wait for the biopsy report   3.  Discussed regarding treatment.  The patient has metastatic disease.  He has some symptoms including some shortness of breath.  Shortness of breath could be from allergies or from lung metastasis.      We discussed regarding different options.  The patient also met with Dr. Waqas Bragg at the [facility].      We again went through different options including high-dose IL-2, axitinib with pembrolizumab, single agent Sutent or pazopanib.      After a long discussion, the patient said that he would like to get an opinion from [facility], which is very appropriate.  The patient is going to set up the appointment himself.        4.  I will see the patient after his [facility] appointment.      TOTAL FACE TO FACE SPENT:  Thirty minutes, most of the time spent in counseling and coordination of care.         JARRET LANDERS MD             D: 08/15/2019   T: 08/15/2019   MT: PAPITO      Name:     GLADYS DOWD   MRN:      [id number]        Account:      [id number]   :      1960           Visit Date:   2019      Document: [id number]       Again, thank you for allowing me to participate in the care of your patient.        Sincerely,        Jarret Landers MD

## 2019-08-14 NOTE — LETTER
"8/14/2019       RE: Jesus Haley  [address] [place] [id number]     Dear Colleague,    Thank you for referring your patient, Jesus Haley, to the [facility] [place] [facility] Please see a copy of my visit note below.    SOUTHMICHELINE  CHIEF COMPLAINT   It was my pleasure to see Jesus Haley who is a 58 year old male for follow-up for post op check following RIGHT lap nephrectomy.      HPI   Jesus Haley is a very pleasant 58 year old male who presents with a history of metastatic kidney cancer.    ##Kidney Follow-up##  Patient is status post Laparoscopic Nephrectomy on 7/31/2019.   Tumor was on the right side.   Maximal tumor dimension was 7.5 cm.    The histologic subtype was Clear Cell.    ISUP Grade 4.    Pathologic Stage T2a.    Tumor thrombus level 0 (renal vein).    Tumor necrosis present: No.  Sarcomatoid features present: No.  Lymph Nodes Removed Yes.   Number of nodes removed 1, number of node positive for cancer 0.   Was the ipsilateral adrenal gland removed? No.  Neoadjuvant Chemotherapy? No.  Was Margin Positive? No.    There were not deviations from a normal post-operative course or complications?.    The patient was not readmitted to the hospital since post-operative discharge.    He is doing well from a postsurgical standpoint.  Pain has significantly improved and is controlled with mainly Tylenol.  Occasionally he will take an oxycodone at night for sleeping.  He has returned to work and is interested in resuming some physical activity.  He has seen Dr. Disla and Dr. Bragg, and is considering treatment options.  He also is planning to receive a second opinion from [facility].    PHYSICAL EXAM  Patient is a 58 year old  male   Vitals: Blood pressure 120/68, pulse 59, height 1.702 m (5' 7\"), weight 68 kg (150 lb), SpO2 98 %.  General Appearance Adult: Body mass index is 23.49 kg/m .  Alert, no acute distress, " oriented  HENT: throat/mouth:normal, good dentition  Lungs: no respiratory distress, or pursed lip breathing  Heart: No obvious jugular venous distension present  Abdomen: soft, nontender, no organomegaly or masses  Incisions: Clean dry and intact with skin glue in place.  No evidence of hernia.  Musculoskeltal: extremities normal, no peripheral edema  Skin: no suspicious lesions or rashes  Neuro: Alert, oriented, speech and mentation normal  Psych: affect and mood normal  Gait: Normal    Creatinine   Date Value Ref Range Status   08/06/2019 1.06 0.66 - 1.25 mg/dL Final      PATHOLOGY 7/31/2019:  TUMOR     Histologic Type:         - Clear cell renal cell carcinoma     Histologic Grade (WHO / ISUP Grade):         - G4: Extreme nuclear pleomorphism and / or multi-nuclear giant   cells and         / or rhabdoid and / or sarcomatoid differentiation     Tumor Size: 7.5 Centimeters (cm)     Tumor Focality:         - Unifocal     Tumor Extent       Tumor Extension:           - Tumor limited to kidney     Accessory Findings       Sarcomatoid Features:           - Not identified       Rhabdoid Features:           - Not identified       Tumor Necrosis:           - Not identified       Lymphovascular Invasion:           - Present     MARGINS     Margins:         - Uninvolved by invasive carcinoma     LYMPH NODES     Number of Lymph Nodes Involved: 0     Number of Lymph Nodes Examined: 1     PATHOLOGIC STAGE CLASSIFICATION (PTNM, AJCC 8TH EDITION)     Primary Tumor (pT):         - pT2a     Regional Lymph Nodes (pN):         - pN0       ASSESSMENT and PLAN  58 year old man with Clear cell renal cell carcinoma, stage IV ([id number]), de claudio metastatic, IMDC poor-risk.  Now s/p LEFT Lap Nx on 7/31/2019    -He is recovering well from a surgical standpoint  -We discussed that he may slowly start to resume some physical activity, though again I advised him to progress very slowly.  -He has a follow-up today in oncology with   [facility], and is considering a lung biopsy prior to receiving a second opinion for treatment at [facility]  -I would like to see him back in 6 months for routine follow-up      I spent over 15 minutes with the patient.  Over half this time was spent on counseling regarding the above.    Jas Berrios   Urology  [facility]  Clinic Phone [phone]

## 2019-08-14 NOTE — PROGRESS NOTES
"OLIVIA  CHIEF COMPLAINT   It was my pleasure to see Jesus Haley who is a 58 year old male for follow-up for post op check following RIGHT lap nephrectomy.      HPI   Jesus Haley is a very pleasant 58 year old male who presents with a history of metastatic kidney cancer.    ##Kidney Follow-up##  Patient is status post Laparoscopic Nephrectomy on 7/31/2019.   Tumor was on the right side.   Maximal tumor dimension was 7.5 cm.    The histologic subtype was Clear Cell.    ISUP Grade 4.    Pathologic Stage T2a.    Tumor thrombus level 0 (renal vein).    Tumor necrosis present: No.  Sarcomatoid features present: No.  Lymph Nodes Removed Yes.   Number of nodes removed 1, number of node positive for cancer 0.   Was the ipsilateral adrenal gland removed? No.  Neoadjuvant Chemotherapy? No.  Was Margin Positive? No.    There were not deviations from a normal post-operative course or complications?.    The patient was not readmitted to the hospital since post-operative discharge.    He is doing well from a postsurgical standpoint.  Pain has significantly improved and is controlled with mainly Tylenol.  Occasionally he will take an oxycodone at night for sleeping.  He has returned to work and is interested in resuming some physical activity.  He has seen Dr. Disla and Dr. Bragg, and is considering treatment options.  He also is planning to receive a second opinion from [facility].    PHYSICAL EXAM  Patient is a 58 year old  male   Vitals: Blood pressure 120/68, pulse 59, height 1.702 m (5' 7\"), weight 68 kg (150 lb), SpO2 98 %.  General Appearance Adult: Body mass index is 23.49 kg/m .  Alert, no acute distress, oriented  HENT: throat/mouth:normal, good dentition  Lungs: no respiratory distress, or pursed lip breathing  Heart: No obvious jugular venous distension present  Abdomen: soft, nontender, no organomegaly or masses  Incisions: Clean dry and intact with skin glue in place.  No evidence of hernia.  Musculoskeltal: " extremities normal, no peripheral edema  Skin: no suspicious lesions or rashes  Neuro: Alert, oriented, speech and mentation normal  Psych: affect and mood normal  Gait: Normal    Creatinine   Date Value Ref Range Status   08/06/2019 1.06 0.66 - 1.25 mg/dL Final      PATHOLOGY 7/31/2019:  TUMOR     Histologic Type:         - Clear cell renal cell carcinoma     Histologic Grade (WHO / ISUP Grade):         - G4: Extreme nuclear pleomorphism and / or multi-nuclear giant   cells and         / or rhabdoid and / or sarcomatoid differentiation     Tumor Size: 7.5 Centimeters (cm)     Tumor Focality:         - Unifocal     Tumor Extent       Tumor Extension:           - Tumor limited to kidney     Accessory Findings       Sarcomatoid Features:           - Not identified       Rhabdoid Features:           - Not identified       Tumor Necrosis:           - Not identified       Lymphovascular Invasion:           - Present     MARGINS     Margins:         - Uninvolved by invasive carcinoma     LYMPH NODES     Number of Lymph Nodes Involved: 0     Number of Lymph Nodes Examined: 1     PATHOLOGIC STAGE CLASSIFICATION (PTNM, AJCC 8TH EDITION)     Primary Tumor (pT):         - pT2a     Regional Lymph Nodes (pN):         - pN0       ASSESSMENT and PLAN  58 year old man with Clear cell renal cell carcinoma, stage IV ([id number]), de claudio metastatic, IMDC poor-risk. Now s/p LEFT Lap Nx on 7/31/2019    -He is recovering well from a surgical standpoint  -We discussed that he may slowly start to resume some physical activity, though again I advised him to progress very slowly.  -He has a follow-up today in oncology with Dr. Disla, and is considering a lung biopsy prior to receiving a second opinion for treatment at [facility]  -I would like to see him back in 6 months for routine follow-up      I spent over 15 minutes with the patient.  Over half this time was spent on counseling regarding the above.    Jas Berrios   Urology  [facility]  [facility] Phone [phone]

## 2019-08-14 NOTE — PATIENT INSTRUCTIONS
1. CT guided lung nodule biopsy.  2. [facility] appointment (patient will schedule himself).  3. Follow-up after [place].

## 2019-08-14 NOTE — NURSING NOTE
Chief Complaint   Patient presents with     Clinic Care Coordination - Follow-up     Pt here for 2 week post-op visit     Rox Rhodes

## 2019-08-14 NOTE — PROGRESS NOTES
"Oncology Rooming Note    August 14, 2019 2:55 PM   Jesus Haley is a 58 year old male who presents for:    Chief Complaint   Patient presents with     Oncology Clinic Visit     Initial Vitals: /76   Pulse 58   Temp 98.2  F (36.8  C) (Oral)   Resp 16   Ht 1.702 m (5' 7\")   Wt 71.3 kg (157 lb 3.2 oz)   SpO2 99%   BMI 24.62 kg/m   Estimated body mass index is 24.62 kg/m  as calculated from the following:    Height as of this encounter: 1.702 m (5' 7\").    Weight as of this encounter: 71.3 kg (157 lb 3.2 oz). Body surface area is 1.84 meters squared.  No Pain (0) Comment: Data Unavailable   No LMP for male patient.  Allergies reviewed: Yes  Medications reviewed: Yes    Medications: Medication refills not needed today.  Pharmacy name entered into [employer]: [facility] [id number] [place] [facility] [id number]    Clinical concerns:  doctor was notified.      Belen Mina CMA            "

## 2019-08-15 NOTE — PROGRESS NOTES
Visit Date:   08/14/2019     ONCOLOGY HISTORY: Mr. Haley is gentleman with metastatic renal cell carcinoma.  1.  Chest x-ray on 07/22/2019 revealed bilateral lung nodules and mediastinal lymphadenopathy.   2.  CT chest without contrast on 07/25/2019 revealed a right renal mass and multiple lung metastases.   3.  CT of the chest, abdomen and pelvis with contrast on 07/26/2019 revealed a 9 cm right renal mass and bilateral pulmonary metastasis.  There is lymphadenopathy and bilateral pulmonary mireya in the mediastinum.  There is a subcentimeter low density in the right lobe of the thyroid.   4. Patient had laparoscopic right radical nephrectomy on 07/31/2019.  Pathology reveals clear cell renal cell carcinoma measuring 7.5 cm.  Grade IV. No sarcomatoid features.  There was lymphovascular invasion. One benign lymph node.  pT2a pN0 M0.     SUBJECTIVE:  Mr. Haley is a 58-year-old gentleman with metastatic renal cell carcinoma.      The patient had a laparoscopic right radical nephrectomy on 07/31/2019.  Pathology reveals clear cell renal cell carcinoma measuring 7.5 cm.  Grade IV.  Tumor was unifocal.  No sarcomatoid features.  There was lymphovascular invasion.  Margins negative.  One benign lymph node.  There was papillary adenoma measuring 13 mm in the superior pole.  The patient has pT2a pN0 M0 disease.      He is recovering well from surgery.  Mild abdominal discomfort.      No headache or dizziness.  No chest pain.  He has some cough.  Some shortness of breath on exertion.  No nausea or vomiting.      PHYSICAL EXAMINATION:   GENERAL:  The patient was alert and oriented x 3.   VITAL SIGNS:  Reviewed.  ECOG PS of 1.   The rest of the systems not examined.      LABORATORY DATA:  Reviewed.      ASSESSMENT:   1.  A 58-year-old gentleman with metastatic right kidney clear cell renal cell carcinoma with lung metastasis.   2.  Seasonal allergies.      PLAN:   1.  I had a long discussion with the patient and his wife.   Reviewed the pathology report in detail.  He has clear cell carcinoma.   2.  Discussed regarding further treatment.  The patient does have multiple lung metastases.  We had discussed regarding biopsy.  The patient wants to get a biopsy.  That will be scheduled.  I explained to them that this is likely renal cell carcinoma, but we will wait for the biopsy report   3.  Discussed regarding treatment.  The patient has metastatic disease.  He has some symptoms including some shortness of breath.  Shortness of breath could be from allergies or from lung metastasis.      We discussed regarding different options.  The patient also met with Dr. Waqas Bragg at the [facility].      We again went through different options including high-dose IL-2, axitinib with pembrolizumab, single agent Sutent or pazopanib.      After a long discussion, the patient said that he would like to get an opinion from [facility], which is very appropriate.  The patient is going to set up the appointment himself.        4.  I will see the patient after his [facility] appointment.      TOTAL FACE TO FACE SPENT:  Thirty minutes, most of the time spent in counseling and coordination of care.         JARRET LANDERS MD             D: 08/15/2019   T: 08/15/2019   MT: PAPITO      Name:     GLADYS DOWD   MRN:      [id number]        Account:      [id number]   :      1960           Visit Date:   2019      Document: [id number]

## 2019-08-22 NOTE — PROGRESS NOTES
Patient tolerated right lung biopsy well by Dr Curiel.   Vitals stable, patient required additional IV pain meds, better pain after ambulation.  Pain better after reposition and ambulation.  Patient and wife state understanding of discharge instructions.  Chest x ray confirms lack lack of pneumothorax.  Patient taking fluids and able to ambulate per self.  Site clean dry and glue intact with no drainage.  IV removed intact.  Radha Spencer, RN, BSN

## 2019-08-23 NOTE — PROGRESS NOTES
Met with patient and wife, to discuss chemotherapy and resources available at the [facility]. Provided patient with  My Cancer Guidebook . Pt's plan for treatment is yet to be determined, therefore no chemo teaching was provided today. Reviewed most concerning symptoms that warrant an immediate call to the clinic nurse line.

## 2019-08-27 NOTE — TELEPHONE ENCOUNTER
Pt returning call to Dr. Disla re lung bx results. Message routed to call pt back.   Dayan Casarez RN

## 2019-08-27 NOTE — TELEPHONE ENCOUNTER
Spoke to the patient.  Informed him of the result of lung biopsy.  It is consistent with renal cell carcinoma.  Patient is going to [facility] for a second opinion.  Will wait for their recommendation.

## 2019-08-28 NOTE — TELEPHONE ENCOUNTER
Lala (wife) calls and says that her  has cancer and has a fever. Fever = 100.4-per forehead scanner. Pt. Has pain wrapping around from his back to his chest. Lala will take pt. To the [facility] ER now.    Reason for Disposition    Difficulty breathing    Additional Information    Negative: Shock suspected (e.g., cold/pale/clammy skin, too weak to stand, low BP, rapid pulse)    Negative: Difficult to awaken or acting confused (e.g., disoriented, slurred speech)    Negative: Bluish (or gray) lips or face now    Negative: New onset rash with many purple (or blood-colored) spots or dots    Negative: Sounds like a life-threatening emergency to the triager    Negative: Other symptom is present, see that guideline  (e.g., symptoms of cough, runny nose, sore throat, earache, abdominal pain, diarrhea, vomiting)    Negative: Fever > 104 F (40 C)    Negative: [1] Neutropenia known or suspected (e.g., recent cancer chemotherapy) AND [2] fever > 100.4 F (38.0 C)    Negative: [1] Neutropenia known or suspected (e.g., recent cancer chemotherapy) AND [2] signs or symptoms of suspected infection are present    Negative: [1] Headache AND [2] stiff neck (can't touch chin to chest)    Protocols used: CANCER - FEVER-A-AH

## 2019-08-29 NOTE — PROGRESS NOTES
Spoke with patient, Jesus, [facility].     Informed Jesus an appointment to meet with Dr. Bragg to discuss treatment plan was scheduled for 9/3. Explained that chemo teaching by writer and oral chemo pharmacist would take place at that time.     Notified patient infusion appointment may have to pushed to week of 9/9 d/t lack of availability in the infusion center. Jesus was understanding and stated he would be fine with starting week of 9/9. Informed him clinic would notify him when an appointment had been scheduled for his infusion.     Jesus mentioned his recent call to triage regarding pain and temperature of 100.4. He stated his pain had improved following oxy x2 and his fever had resolved following Tylenol, per the triage RNs recommendations.     Encouraged Jesus to call triage if symptoms persisted. Jesus verbalized understanding.     Merary Galdamez RN

## 2019-08-29 NOTE — TELEPHONE ENCOUNTER
"Spoke with pt regarding mychart message. He was not aware he was scheduled to follow-up with Dr. Bragg on Tuesday Sept 5th at 3:30 pm, state he can come and discuss treatment plans then. Stated he'd been using Oxy at night and tylenol q6h since biopsy but pain is worse at night after lying down for  4-5 hours. Yesterday he received flexeril from [place] and felt that helped him sleep for about 6 hours and will begin taking before bedtime.  State pain feels muscular , wraps from L chest to lower R side back in a \"grabbing\" sensation. He did have a temp of 100.4 last night but did not go to ED and temp was gone this morning. During the day pain is not nearly as bad. Incision \"looks great\".      Advised pt since pain was relieved with flexeril and seems inflammatory, he should continue flexeril, try ibuprofen q6h instead of tylenol and alternate Oxy with the tramadol that Dr. Bragg had prescribed for him. If pain is not relieved on regularly dosed meds call for assistance, can always come to either [facility] or [facility] ED. Pt verbalized understanding.  "

## 2019-09-04 NOTE — ORAL ONC MGMT
Oral Chemotherapy Monitoring Program     Placed call to patient in follow up of therapy.     Left message to please call back in follow-up of therapy. No patient or drug names were mentioned.     Daquan Aleman PharmD  [facility]  [phone]  September 4, 2019

## 2019-09-04 NOTE — TELEPHONE ENCOUNTER
TC to pt to review pt's choice to continue care with [facility]. Pt stated he was very pleased with the care he received here and stated that he was also very happy with his consult at [facility] and that the drive from home is a little bit easier to [facility], and also that the provider he saw is taking on a fellow and will be able to accept new pts. This wasn't the case at the time of his appt. He may consider coming back to [facility] if things aren't going well down the road at [facility] and will call if he would like to return here.     ----- Message from Waqas Bragg MD sent at 9/3/2019 12:40 PM CDT -----  Regarding: RE: 60 min Keytruda  next week  Merary/Salima,  Pt had called in earlier requesting to start treatment asap and now has gone to [facility]? Can you find out what he wants?     If he chooses to delay treatment, I'll respect his choice, but I don't want any holdups from our end.  Sammy,  AR    ----- Message -----  From: Sergey Schmid RN  Sent: 9/3/2019  10:21 AM  To: Sergey Schmid RN, Waqas Bragg MD, #  Subject: FW: 60 min Keytruda  next week                   Hello,    Looks like he canceled today's appt and going to [facility].    OK to disregard this request for now?    Sergey Christianson  ----- Message -----  From: Marichuy Man  Sent: 8/29/2019  12:56 PM  To: Sergey Espino RN, #  Subject: 60 min Keytruda  next week                       Davin,    Dr Bragg would like patient to start infusions next week.    Can we get Jesus on the waitlist?    Thank you,    Marichuy

## 2019-09-05 NOTE — ORAL ONC MGMT
Jesus returned our call. Gave him the message from Dr. Bragg to hold the start of Inlyta till his physician at the [facility] tells him it is OK to start.     Jesus expressed understanding and agreement with this plan. He said the team at [facility] would be helping him with insurance, refills and therapy monitoring going forward. He thanked me for the call and care.    Daquan IbanezD  [facility]  [phone]  September 5, 2019

## 2019-09-14 NOTE — ED AVS SNAPSHOT
[facility] Emergency Department  [address]  [facility] [address]  Phone:  [phone]  Fax:  [phone]                                    Jesus Haley   MRN: [id number]    Department:  [facility] Emergency Department   Date of Visit:  9/14/2019           After Visit Summary Signature Page    I have received my discharge instructions, and my questions have been answered. I have discussed any challenges I see with this plan with the nurse or doctor.    ..........................................................................................................................................  Patient/Patient Representative Signature      ..........................................................................................................................................  Patient Representative Print Name and Relationship to Patient    ..................................................               ................................................  Date                                   Time    ..........................................................................................................................................  Reviewed by Signature/Title    ...................................................              ..............................................  Date                                               Time          22EPIC Rev 08/18

## 2019-09-14 NOTE — ED TRIAGE NOTES
Patient with renal cell carcinoma and had his first Keytruda infusion on Thursday 9/12, woke up yesterday with acute weakness and musculoskeletal pain that is not resolved this morning. He reports he was referred to ED by Dr. Steven Lacey (oncology at [place]) for labs.

## 2019-09-14 NOTE — ED PROVIDER NOTES
History     Chief Complaint:    weakness      HPI   Jesus Haley is a 59 year old male who presents with severe muscle pain and generalized weakness since starting keytruda for his renal cell carcinoma. Patient received his first dose of the infusion at [facility] on Thursday.  He had mild pain afterwards, but then he woke up yesterday morning at 5am with severe debilitating weakness and muscle pain.  Wife states that he was even having trouble lifting 1/2 gallon of milk.  He was unable to get out of bed without assistance, even requiring his wife to help him with getting his legs up off the floor.  He was unable to even climb stairs when the muscle pain got severe.  They called his oncologist, Dr. Lacey, who discuss the possibility of myositis from Keytruda.  They elected to watch him for 24 hours, and if things got worse then he would need to obtain labs.  They have been using Advil and Tylenol for his muscle pain.  Wife states that he is feeling better when he is medicated.  This morning he was able to ambulate and do more things than yesterday.  Prior to arrival he took 1000 mg of Tylenol at 7 AM.  Last dose of Advil was last night at 10 PM.  He denies any nausea, vomiting, diarrhea, fever, dysuria, color urine.    Allergies:   Zyrtec    Medications:        acetaminophen (TYLENOL) 500 MG tablet   albuterol (VENTOLIN HFA) 108 (90 Base) MCG/ACT inhaler   axitinib (INLYTA) 5 MG tablet CHEMO   docusate sodium (COLACE) 100 MG tablet   fish oil-omega-3 fatty acids (OMEGA-3 FISH OIL) 1000 MG capsule   FLUZONE QUADRIVALENT 0.5 ML injection   oxyCODONE (ROXICODONE) 5 MG tablet   traMADol (ULTRAM) 50 MG tablet   Keytruda    Problem List:      Patient Active Problem List    Diagnosis Date Noted     Renal malignant neoplasm, right (H) 07/31/2019     Priority: Medium     Right kidney mass 07/29/2019     Priority: Medium     Pulmonary nodules 07/29/2019     Priority: Medium     Essential hypertension, benign 07/22/2019      Priority: Medium     Routine general medical examination at a health care facility 06/08/2006     Priority: Medium        Past Medical History:      Past Medical History:   Diagnosis Date     Unspecified asthma(493.90)        Past Surgical History:      Past Surgical History:   Procedure Laterality Date     collar bone fracture Left 2001     LAPAROSCOPIC NEPHRECTOMY Right 7/31/2019    Procedure: RIGHT LAPAROSCOPIC RADICAL NEPHRECTOMY;  Surgeon: Jas Berrios MD;  Location: [facility] OR     SHOULDER SURGERY Left 1981    dislocated shoulder        Family History:      Family History   Problem Relation Age of Onset     Diabetes Father         diet and alcohol related       Social History:    Marital Status:   [2]  Social History     Tobacco Use     Smoking status: Never Smoker     Smokeless tobacco: Never Used   Substance Use Topics     Alcohol use: No     Drug use: No        Review of Systems  Please see HPI. All other systems reviewed and negative.      Physical Exam   First Vitals:  BP: (!) 139/92  Pulse: 68  Heart Rate: 68  Temp: 98  F (36.7  C)  Resp: 16  Weight: 68 kg (150 lb)  SpO2: 98 %      Physical Exam   Constitutional: He appears well-developed and well-nourished.   HENT:   Right Ear: External ear normal.   Left Ear: External ear normal.   Mouth/Throat: Oropharynx is clear and moist. No oropharyngeal exudate.   Eyes: Pupils are equal, round, and reactive to light. Conjunctivae are normal. No scleral icterus.   Cardiovascular: Normal rate, regular rhythm, normal heart sounds and intact distal pulses. Exam reveals no gallop and no friction rub.   No murmur heard.  Pulmonary/Chest: Effort normal and breath sounds normal. No respiratory distress. He has no wheezes. He has no rales.   Abdominal: Soft. Bowel sounds are normal. He exhibits no distension and no mass. There is no tenderness.   Musculoskeletal: He exhibits no edema.   Neurological: He is alert.   Skin: Skin is warm and dry. No rash noted.    Psychiatric: He has a normal mood and affect.       Emergency Department Course        Laboratory:  CMP - WNL  CBC- hgb 11.5, hematocrit 35.8, others WNL  INR- 0.99  Lactate - 1.1  CK -54    Emergency Department Course:       Patient seen and evaluated. Labs obtained. IV refused.  Impression & Plan      Medical Decision Making:  Patient present for evaluation of generalized muscle weakness due to Keytruda use.  His symptoms are improved this morning after 24 hours of Advil and Tylenol usage.  His oncologist concern for myositis, and will obtain labs to evaluate his condition.  His labs are within normal parameter.  Lactic acid was normal along with normal CK.  He has been moving around better and walking on his own this morning.  Most likely the cause of the symptoms are due to Keytruda initiation.  He is comfortable going home and following up with his [facility] oncologist.  He will return if he has any further symptoms.      Diagnosis:    ICD-10-CM    1. Generalized muscle weakness M62.81        Disposition:  discharged to home      Shantal Eli MD  9/14/2019   [facility] EMERGENCY DEPARTMENT       Shantal Eli MD  09/14/19 1256

## 2019-12-01 PROBLEM — C64.1: Status: ACTIVE | Noted: 2019-01-01

## 2020-01-01 ENCOUNTER — MYC MEDICAL ADVICE (OUTPATIENT)
Dept: INTERNAL MEDICINE | Facility: CLINIC | Age: 60
End: 2020-01-01

## 2020-01-01 ENCOUNTER — OFFICE VISIT (OUTPATIENT)
Dept: UROLOGY | Facility: CLINIC | Age: 60
End: 2020-01-01
Payer: COMMERCIAL

## 2020-01-01 ENCOUNTER — TRANSFERRED RECORDS (OUTPATIENT)
Dept: HEALTH INFORMATION MANAGEMENT | Facility: CLINIC | Age: 60
End: 2020-01-01

## 2020-01-01 ENCOUNTER — DOCUMENTATION ONLY (OUTPATIENT)
Dept: INTERNAL MEDICINE | Facility: CLINIC | Age: 60
End: 2020-01-01

## 2020-01-01 ENCOUNTER — DOCUMENTATION ONLY (OUTPATIENT)
Dept: OTHER | Facility: CLINIC | Age: 60
End: 2020-01-01

## 2020-01-01 ENCOUNTER — TELEPHONE (OUTPATIENT)
Dept: INTERNAL MEDICINE | Facility: CLINIC | Age: 60
End: 2020-01-01

## 2020-01-01 ENCOUNTER — MEDICAL CORRESPONDENCE (OUTPATIENT)
Dept: HEALTH INFORMATION MANAGEMENT | Facility: CLINIC | Age: 60
End: 2020-01-01

## 2020-01-01 VITALS
OXYGEN SATURATION: 96 % | HEART RATE: 76 BPM | BODY MASS INDEX: 24.01 KG/M2 | DIASTOLIC BLOOD PRESSURE: 74 MMHG | SYSTOLIC BLOOD PRESSURE: 138 MMHG | WEIGHT: 153 LBS | HEIGHT: 67 IN

## 2020-01-01 DIAGNOSIS — C64.9 METASTATIC RENAL CELL CARCINOMA, UNSPECIFIED LATERALITY (H): ICD-10-CM

## 2020-01-01 DIAGNOSIS — C64.9 METASTATIC RENAL CELL CARCINOMA (H): Primary | ICD-10-CM

## 2020-01-01 DIAGNOSIS — C64.9 METASTATIC RENAL CELL CARCINOMA (H): ICD-10-CM

## 2020-01-01 DIAGNOSIS — C64.9 RENAL CELL CARCINOMA, UNSPECIFIED LATERALITY (H): Primary | ICD-10-CM

## 2020-01-01 DIAGNOSIS — C64.1 RENAL MALIGNANT NEOPLASM, RIGHT (H): Primary | ICD-10-CM

## 2020-01-01 LAB
ALP SERPL-CCNC: 61 U/L (ref 40–150)
ALP SERPL-CCNC: 62 U/L (ref 40–150)
ALP SERPL-CCNC: 63 U/L (ref 40–150)
ALP SERPL-CCNC: 72 U/L (ref 40–150)
ALP SERPL-CCNC: 75 U/L (ref 40–150)
ALT SERPL W P-5'-P-CCNC: 48 U/L (ref 0–70)
ALT SERPL W P-5'-P-CCNC: 50 U/L (ref 0–70)
ALT SERPL W P-5'-P-CCNC: 52 U/L (ref 0–70)
ALT SERPL W P-5'-P-CCNC: 55 U/L (ref 0–70)
ALT SERPL W P-5'-P-CCNC: 56 U/L (ref 0–70)
ALT SERPL W P-5'-P-CCNC: 63 U/L (ref 0–70)
ALT SERPL W P-5'-P-CCNC: 96 U/L (ref 0–70)
AST SERPL W P-5'-P-CCNC: 21 U/L (ref 0–45)
AST SERPL W P-5'-P-CCNC: 21 U/L (ref 0–45)
AST SERPL W P-5'-P-CCNC: 22 U/L (ref 0–45)
AST SERPL W P-5'-P-CCNC: 24 U/L (ref 0–45)
AST SERPL W P-5'-P-CCNC: 24 U/L (ref 0–45)
AST SERPL W P-5'-P-CCNC: 26 U/L (ref 0–45)
AST SERPL W P-5'-P-CCNC: 40 U/L (ref 0–45)
BASOPHILS # BLD AUTO: 0 10E9/L (ref 0–0.2)
BASOPHILS NFR BLD AUTO: 0.1 %
BASOPHILS NFR BLD AUTO: 0.4 %
BILIRUB DIRECT SERPL-MCNC: 0.1 MG/DL (ref 0–0.2)
BILIRUB DIRECT SERPL-MCNC: <0.1 MG/DL (ref 0–0.2)
BILIRUB SERPL-MCNC: 0.3 MG/DL (ref 0.2–1.3)
BILIRUB SERPL-MCNC: 0.4 MG/DL (ref 0.2–1.3)
CREAT SERPL-MCNC: 0.88 MG/DL (ref 0.66–1.25)
CREAT SERPL-MCNC: 0.97 MG/DL (ref 0.66–1.25)
CREAT SERPL-MCNC: 0.97 MG/DL (ref 0.66–1.25)
CREAT SERPL-MCNC: 1.01 MG/DL (ref 0.66–1.25)
CREAT SERPL-MCNC: 1.01 MG/DL (ref 0.66–1.25)
DIFFERENTIAL METHOD BLD: ABNORMAL
EOSINOPHIL # BLD AUTO: 0.1 10E9/L (ref 0–0.7)
EOSINOPHIL # BLD AUTO: 0.2 10E9/L (ref 0–0.7)
EOSINOPHIL # BLD AUTO: 0.3 10E9/L (ref 0–0.7)
EOSINOPHIL NFR BLD AUTO: 1.3 %
EOSINOPHIL NFR BLD AUTO: 1.6 %
EOSINOPHIL NFR BLD AUTO: 1.8 %
EOSINOPHIL NFR BLD AUTO: 2.9 %
EOSINOPHIL NFR BLD AUTO: 3.4 %
ERYTHROCYTE [DISTWIDTH] IN BLOOD BY AUTOMATED COUNT: 12.9 % (ref 10–15)
ERYTHROCYTE [DISTWIDTH] IN BLOOD BY AUTOMATED COUNT: 13 % (ref 10–15)
ERYTHROCYTE [DISTWIDTH] IN BLOOD BY AUTOMATED COUNT: 13.3 % (ref 10–15)
ERYTHROCYTE [DISTWIDTH] IN BLOOD BY AUTOMATED COUNT: 13.6 % (ref 10–15)
ERYTHROCYTE [DISTWIDTH] IN BLOOD BY AUTOMATED COUNT: 14.1 % (ref 10–15)
GFR SERPL CREATININE-BSD FRML MDRD: 81 ML/MIN/{1.73_M2}
GFR SERPL CREATININE-BSD FRML MDRD: 81 ML/MIN/{1.73_M2}
GFR SERPL CREATININE-BSD FRML MDRD: 84 ML/MIN/{1.73_M2}
GFR SERPL CREATININE-BSD FRML MDRD: 84 ML/MIN/{1.73_M2}
GFR SERPL CREATININE-BSD FRML MDRD: >90 ML/MIN/{1.73_M2}
HCT VFR BLD AUTO: 40.4 % (ref 40–53)
HCT VFR BLD AUTO: 41 % (ref 40–53)
HCT VFR BLD AUTO: 41.2 % (ref 40–53)
HCT VFR BLD AUTO: 42.3 % (ref 40–53)
HCT VFR BLD AUTO: 42.5 % (ref 40–53)
HGB BLD-MCNC: 13.4 G/DL (ref 13.3–17.7)
HGB BLD-MCNC: 13.4 G/DL (ref 13.3–17.7)
HGB BLD-MCNC: 13.6 G/DL (ref 13.3–17.7)
HGB BLD-MCNC: 14 G/DL (ref 13.3–17.7)
HGB BLD-MCNC: 14.9 G/DL (ref 13.3–17.7)
LYMPHOCYTES # BLD AUTO: 0.5 10E9/L (ref 0.8–5.3)
LYMPHOCYTES # BLD AUTO: 0.6 10E9/L (ref 0.8–5.3)
LYMPHOCYTES NFR BLD AUTO: 10.4 %
LYMPHOCYTES NFR BLD AUTO: 11.6 %
LYMPHOCYTES NFR BLD AUTO: 6.1 %
LYMPHOCYTES NFR BLD AUTO: 6.3 %
LYMPHOCYTES NFR BLD AUTO: 9.5 %
MCH RBC QN AUTO: 30.6 PG (ref 26.5–33)
MCH RBC QN AUTO: 30.8 PG (ref 26.5–33)
MCH RBC QN AUTO: 31 PG (ref 26.5–33)
MCH RBC QN AUTO: 31.4 PG (ref 26.5–33)
MCH RBC QN AUTO: 32.5 PG (ref 26.5–33)
MCHC RBC AUTO-ENTMCNC: 32.7 G/DL (ref 31.5–36.5)
MCHC RBC AUTO-ENTMCNC: 33 G/DL (ref 31.5–36.5)
MCHC RBC AUTO-ENTMCNC: 33.1 G/DL (ref 31.5–36.5)
MCHC RBC AUTO-ENTMCNC: 33.2 G/DL (ref 31.5–36.5)
MCHC RBC AUTO-ENTMCNC: 35.1 G/DL (ref 31.5–36.5)
MCV RBC AUTO: 93 FL (ref 78–100)
MCV RBC AUTO: 93 FL (ref 78–100)
MCV RBC AUTO: 94 FL (ref 78–100)
MCV RBC AUTO: 94 FL (ref 78–100)
MCV RBC AUTO: 95 FL (ref 78–100)
MONOCYTES # BLD AUTO: 0.2 10E9/L (ref 0–1.3)
MONOCYTES # BLD AUTO: 0.3 10E9/L (ref 0–1.3)
MONOCYTES # BLD AUTO: 0.3 10E9/L (ref 0–1.3)
MONOCYTES # BLD AUTO: 0.4 10E9/L (ref 0–1.3)
MONOCYTES # BLD AUTO: 0.4 10E9/L (ref 0–1.3)
MONOCYTES NFR BLD AUTO: 4.3 %
MONOCYTES NFR BLD AUTO: 4.5 %
MONOCYTES NFR BLD AUTO: 4.9 %
MONOCYTES NFR BLD AUTO: 5.1 %
MONOCYTES NFR BLD AUTO: 6.5 %
NEUTROPHILS # BLD AUTO: 3.9 10E9/L (ref 1.6–8.3)
NEUTROPHILS # BLD AUTO: 4.4 10E9/L (ref 1.6–8.3)
NEUTROPHILS # BLD AUTO: 4.7 10E9/L (ref 1.6–8.3)
NEUTROPHILS # BLD AUTO: 6.4 10E9/L (ref 1.6–8.3)
NEUTROPHILS # BLD AUTO: 7 10E9/L (ref 1.6–8.3)
NEUTROPHILS NFR BLD AUTO: 81.1 %
NEUTROPHILS NFR BLD AUTO: 82 %
NEUTROPHILS NFR BLD AUTO: 83 %
NEUTROPHILS NFR BLD AUTO: 85.4 %
NEUTROPHILS NFR BLD AUTO: 86.6 %
PLATELET # BLD AUTO: 278 10E9/L (ref 150–450)
PLATELET # BLD AUTO: 281 10E9/L (ref 150–450)
PLATELET # BLD AUTO: 281 10E9/L (ref 150–450)
PLATELET # BLD AUTO: 299 10E9/L (ref 150–450)
PLATELET # BLD AUTO: 386 10E9/L (ref 150–450)
POTASSIUM SERPL-SCNC: 3.9 MMOL/L (ref 3.4–5.3)
POTASSIUM SERPL-SCNC: 4.4 MMOL/L (ref 3.4–5.3)
POTASSIUM SERPL-SCNC: 4.4 MMOL/L (ref 3.4–5.3)
POTASSIUM SERPL-SCNC: 4.5 MMOL/L (ref 3.4–5.3)
POTASSIUM SERPL-SCNC: 4.6 MMOL/L (ref 3.4–5.3)
RBC # BLD AUTO: 4.27 10E12/L (ref 4.4–5.9)
RBC # BLD AUTO: 4.35 10E12/L (ref 4.4–5.9)
RBC # BLD AUTO: 4.44 10E12/L (ref 4.4–5.9)
RBC # BLD AUTO: 4.51 10E12/L (ref 4.4–5.9)
RBC # BLD AUTO: 4.58 10E12/L (ref 4.4–5.9)
SODIUM SERPL-SCNC: 133 MMOL/L (ref 133–144)
SODIUM SERPL-SCNC: 134 MMOL/L (ref 133–144)
SODIUM SERPL-SCNC: 135 MMOL/L (ref 133–144)
SODIUM SERPL-SCNC: 137 MMOL/L (ref 133–144)
SODIUM SERPL-SCNC: 137 MMOL/L (ref 133–144)
WBC # BLD AUTO: 4.7 10E9/L (ref 4–11)
WBC # BLD AUTO: 5.5 10E9/L (ref 4–11)
WBC # BLD AUTO: 5.7 10E9/L (ref 4–11)
WBC # BLD AUTO: 7.4 10E9/L (ref 4–11)
WBC # BLD AUTO: 8.2 10E9/L (ref 4–11)

## 2020-01-01 PROCEDURE — 84132 ASSAY OF SERUM POTASSIUM: CPT

## 2020-01-01 PROCEDURE — 82565 ASSAY OF CREATININE: CPT

## 2020-01-01 PROCEDURE — 84450 TRANSFERASE (AST) (SGOT): CPT

## 2020-01-01 PROCEDURE — 36415 COLL VENOUS BLD VENIPUNCTURE: CPT

## 2020-01-01 PROCEDURE — 82248 BILIRUBIN DIRECT: CPT

## 2020-01-01 PROCEDURE — 84075 ASSAY ALKALINE PHOSPHATASE: CPT

## 2020-01-01 PROCEDURE — 84295 ASSAY OF SERUM SODIUM: CPT

## 2020-01-01 PROCEDURE — 85025 COMPLETE CBC W/AUTO DIFF WBC: CPT

## 2020-01-01 PROCEDURE — 82247 BILIRUBIN TOTAL: CPT

## 2020-01-01 PROCEDURE — 84460 ALANINE AMINO (ALT) (SGPT): CPT

## 2020-01-01 PROCEDURE — 99213 OFFICE O/P EST LOW 20 MIN: CPT | Performed by: UROLOGY

## 2020-01-01 RX ORDER — PREDNISONE 2.5 MG/1
TABLET ORAL
COMMUNITY
Start: 2020-01-01

## 2020-01-01 RX ORDER — HYDROCHLOROTHIAZIDE 12.5 MG/1
TABLET ORAL
COMMUNITY
Start: 2019-01-01

## 2020-01-01 ASSESSMENT — MIFFLIN-ST. JEOR: SCORE: 1467.63

## 2020-01-01 ASSESSMENT — PAIN SCALES - GENERAL: PAINLEVEL: NO PAIN (0)

## 2020-03-11 NOTE — PROGRESS NOTES
"[facility]  CHIEF COMPLAINT   It was my pleasure to see Jesus Haley who is a 59 year old male for follow-up for post op check following RIGHT lap nephrectomy.      HPI   Jesus Haley is a very pleasant 59 year old male who presents with a history of metastatic kidney cancer.    ##Kidney Follow-up##  Patient is status post Laparoscopic Nephrectomy on 7/31/2019.   Tumor was on the right side.   Maximal tumor dimension was 7.5 cm.    The histologic subtype was Clear Cell.    ISUP Grade 4.    Pathologic Stage T2a.    Tumor thrombus level 0 (renal vein).    Tumor necrosis present: No.  Sarcomatoid features present: No.  Lymph Nodes Removed Yes.   Number of nodes removed 1, number of node positive for cancer 0.   Was the ipsilateral adrenal gland removed? No.  Neoadjuvant Chemotherapy? No.  Was Margin Positive? No.    There were not deviations from a normal post-operative course or complications?.    The patient was not readmitted to the hospital since post-operative discharge.    He is doing well from a postsurgical standpoint.      He is following with Dr. Lacey at [facility] for [facility] and is currently on cabozantinib. Last seen on 2/5/20 and planning for next surveillance imaging in a few months.       PHYSICAL EXAM  Patient is a 59 year old  male   Vitals: Blood pressure 138/74, pulse 76, height 1.702 m (5' 7\"), weight 69.4 kg (153 lb), SpO2 96 %.  General Appearance Adult: Body mass index is 23.96 kg/m .  Alert, no acute distress, oriented  HENT: throat/mouth:normal, good dentition  Lungs: no respiratory distress, or pursed lip breathing  Heart: No obvious jugular venous distension present  Abdomen: soft, nontender, no organomegaly or masses  Incisions:  No evidence of hernia.  Musculoskeltal: extremities normal, no peripheral edema  Skin: no suspicious lesions or rashes  Neuro: Alert, oriented, speech and mentation normal  Psych: affect and mood normal  Gait: Normal    Creatinine   Date Value Ref Range Status "   03/06/2020 0.97 0.66 - 1.25 mg/dL Final      PATHOLOGY 7/31/2019:  TUMOR     Histologic Type:         - Clear cell renal cell carcinoma     Histologic Grade (WHO / ISUP Grade):         - G4: Extreme nuclear pleomorphism and / or multi-nuclear giant   cells and         / or rhabdoid and / or sarcomatoid differentiation     Tumor Size: 7.5 Centimeters (cm)     Tumor Focality:         - Unifocal     Tumor Extent       Tumor Extension:           - Tumor limited to kidney     Accessory Findings       Sarcomatoid Features:           - Not identified       Rhabdoid Features:           - Not identified       Tumor Necrosis:           - Not identified       Lymphovascular Invasion:           - Present     MARGINS     Margins:         - Uninvolved by invasive carcinoma     LYMPH NODES     Number of Lymph Nodes Involved: 0     Number of Lymph Nodes Examined: 1     PATHOLOGIC STAGE CLASSIFICATION (PTNM, AJCC 8TH EDITION)     Primary Tumor (pT):         - pT2a     Regional Lymph Nodes (pN):         - pN0       ASSESSMENT and PLAN  59 year old man with Clear cell renal cell carcinoma, stage IV ([id number]), de claudio metastatic, IMDC poor-risk. Now s/p LEFT Lap Nx on 7/31/2019.     - He is following with Dr. Lacey in Oncology at [place]  - We discussed that there is no further need for Urology follow up at this time  - I advised that should he have any questions or urologic needs in the further to not hesitate to reach out    I spent over 15 minutes with the patient.  Over half this time was spent on counseling regarding the above.    Jas Berrios   Urology  [facility]  Clinic Phone [phone]

## 2020-03-11 NOTE — NURSING NOTE
Chief Complaint   Patient presents with     Hx of Kidney Cancer     Patient her today for 6 month Follow      Patient here today for 6 Month Follow up after being seen at [place]  For his Kidney Cancer.          DILMA Nevarez

## 2020-04-29 NOTE — TELEPHONE ENCOUNTER
Radiology department is reaching out to pcp to see if imaging orders can be deferred until after COVID 19 or whether they should reach out to patient to schedule appointment now.  Please call Rox at [phone] to let her know Keep or Defer.

## 2020-06-13 NOTE — PROGRESS NOTES
Dear Dr. Saab,    We are notifying you of a  Hospice Death.    Jesus Haley; MRN [id number]   peacefully On date 20  Time 6:15 AM  Sincerely [facility]  Abimbola Quintanilla  [phone]

## 2022-01-10 NOTE — NURSING NOTE
"Chief Complaint   Patient presents with     Physical     non fasting, Pain under L breast will last about 10 seconds, on/off the past year.     initial /70 (BP Location: Left arm, Patient Position: Chair, Cuff Size: Adult Regular)   Pulse 58   Temp 97.6  F (36.4  C) (Oral)   Resp 18   Ht 1.702 m (5' 7\")   Wt 73 kg (161 lb)   SpO2 100%   BMI 25.22 kg/m   Estimated body mass index is 25.22 kg/m  as calculated from the following:    Height as of this encounter: 1.702 m (5' 7\").    Weight as of this encounter: 73 kg (161 lb)..  bp completed using cuff size regular    "
regular rate and rhythm

## (undated) DEVICE — ENDO TROCAR FIRST ENTRY KII FIOS Z-THRD 12X100MM CTF73

## (undated) DEVICE — SU MONOCRYL 4-0 PS-2 18" UND Y496G

## (undated) DEVICE — STPL ENDO RELOAD 45MM VASCULAR MEDIUM TAN EGIA45AVM

## (undated) DEVICE — SUCTION CANISTER MEDIVAC LINER 3000ML W/LID 65651-530

## (undated) DEVICE — ADH SKIN CLOSURE PREMIERPRO EXOFIN 1.0ML 3470

## (undated) DEVICE — ESU CORD MONOPOLAR 10'  E0510

## (undated) DEVICE — SU VICRYL 0 UR-6 27" J603H

## (undated) DEVICE — NDL INSUFFLATION 13GA 120MM C2201

## (undated) DEVICE — DRAPE IOBAN INCISE 23X17" 6650EZ

## (undated) DEVICE — SU PDS II 0 CT-2 27" Z334H

## (undated) DEVICE — SOL WATER IRRIG 1000ML BOTTLE 2F7114

## (undated) DEVICE — CLIP ENDO HEMO-LOC PURPLE LG 544240

## (undated) DEVICE — ENDO TROCAR SLEEVE KII Z-THREADED 05X100MM CTS02

## (undated) DEVICE — STPL ENDO HANDLE GIA ULTRA UNIVERSAL STD EGIAUSTND

## (undated) DEVICE — ESU ENDO SCISSORS 5MM CVD 5DCS

## (undated) DEVICE — ESU GROUND PAD UNIVERSAL W/O CORD

## (undated) DEVICE — SYSTEM CLEARIFY VISUALIZATION 21-345

## (undated) DEVICE — TAPE DURAPORE 3" SILK 1538-3

## (undated) DEVICE — ENDO TROCAR FIRST ENTRY KII FIOS Z-THRD 05X100MM CTF03

## (undated) DEVICE — SOL NACL 0.9% INJ 1000ML BAG 2B1324X

## (undated) DEVICE — PACK LAP CHOLE SLC15LCFSD

## (undated) DEVICE — ESU HOLDER LAP INST DISP PURPLE LONG 330MM H-PRO-330

## (undated) DEVICE — GLOVE PROTEXIS MICRO 7.5  2D73PM75

## (undated) DEVICE — ESU PENCIL W/HOLSTER E2350H

## (undated) DEVICE — GLOVE PROTEXIS BLUE W/NEU-THERA 8.0  2D73EB80

## (undated) DEVICE — LINEN TOWEL PACK X5 5464

## (undated) DEVICE — DEVICE SUTURE GRASPER TROCAR CLOSURE 14GA PMITCSG

## (undated) DEVICE — ENDO POUCH UNIVERSAL RETRIEVAL SYSTEM INZII 12/15MM CD004

## (undated) DEVICE — EVAC SYSTEM CLEAR FLOW SC082500

## (undated) DEVICE — SUCTION IRR STRYKERFLOW II W/TIP 250-070-520

## (undated) RX ORDER — LIDOCAINE HYDROCHLORIDE 20 MG/ML
INJECTION, SOLUTION EPIDURAL; INFILTRATION; INTRACAUDAL; PERINEURAL
Status: DISPENSED
Start: 2019-01-01

## (undated) RX ORDER — GLYCOPYRROLATE 0.2 MG/ML
INJECTION, SOLUTION INTRAMUSCULAR; INTRAVENOUS
Status: DISPENSED
Start: 2019-01-01

## (undated) RX ORDER — HYDROMORPHONE HYDROCHLORIDE 1 MG/ML
INJECTION, SOLUTION INTRAMUSCULAR; INTRAVENOUS; SUBCUTANEOUS
Status: DISPENSED
Start: 2019-01-01

## (undated) RX ORDER — VECURONIUM BROMIDE 1 MG/ML
INJECTION, POWDER, LYOPHILIZED, FOR SOLUTION INTRAVENOUS
Status: DISPENSED
Start: 2019-01-01

## (undated) RX ORDER — ALBUTEROL SULFATE 90 UG/1
AEROSOL, METERED RESPIRATORY (INHALATION)
Status: DISPENSED
Start: 2019-01-01

## (undated) RX ORDER — PROPOFOL 10 MG/ML
INJECTION, EMULSION INTRAVENOUS
Status: DISPENSED
Start: 2019-01-01

## (undated) RX ORDER — CEFAZOLIN SODIUM 2 G/100ML
INJECTION, SOLUTION INTRAVENOUS
Status: DISPENSED
Start: 2019-01-01

## (undated) RX ORDER — BUPIVACAINE HYDROCHLORIDE 5 MG/ML
INJECTION, SOLUTION EPIDURAL; INTRACAUDAL
Status: DISPENSED
Start: 2019-01-01

## (undated) RX ORDER — NEOSTIGMINE METHYLSULFATE 1 MG/ML
VIAL (ML) INJECTION
Status: DISPENSED
Start: 2019-01-01

## (undated) RX ORDER — MEPERIDINE HYDROCHLORIDE 25 MG/ML
INJECTION INTRAMUSCULAR; INTRAVENOUS; SUBCUTANEOUS
Status: DISPENSED
Start: 2019-01-01

## (undated) RX ORDER — DEXAMETHASONE SODIUM PHOSPHATE 4 MG/ML
INJECTION, SOLUTION INTRA-ARTICULAR; INTRALESIONAL; INTRAMUSCULAR; INTRAVENOUS; SOFT TISSUE
Status: DISPENSED
Start: 2019-01-01

## (undated) RX ORDER — CEFAZOLIN SODIUM 1 G/3ML
INJECTION, POWDER, FOR SOLUTION INTRAMUSCULAR; INTRAVENOUS
Status: DISPENSED
Start: 2019-01-01

## (undated) RX ORDER — ONDANSETRON 2 MG/ML
INJECTION INTRAMUSCULAR; INTRAVENOUS
Status: DISPENSED
Start: 2019-01-01

## (undated) RX ORDER — FENTANYL CITRATE 50 UG/ML
INJECTION, SOLUTION INTRAMUSCULAR; INTRAVENOUS
Status: DISPENSED
Start: 2019-01-01